# Patient Record
Sex: FEMALE | Race: WHITE | Employment: FULL TIME | URBAN - NONMETROPOLITAN AREA
[De-identification: names, ages, dates, MRNs, and addresses within clinical notes are randomized per-mention and may not be internally consistent; named-entity substitution may affect disease eponyms.]

---

## 2017-03-30 RX ORDER — CELECOXIB 200 MG/1
200 CAPSULE ORAL DAILY
COMMUNITY
End: 2018-10-29

## 2017-03-30 RX ORDER — HYDROXYZINE PAMOATE 25 MG/1
25 CAPSULE ORAL 3 TIMES DAILY PRN
COMMUNITY
End: 2018-10-29

## 2017-03-30 RX ORDER — SERTRALINE HYDROCHLORIDE 100 MG/1
100 TABLET, FILM COATED ORAL DAILY
COMMUNITY
End: 2018-10-29

## 2017-04-14 ENCOUNTER — NURSE ONLY (OUTPATIENT)
Dept: SURGERY | Age: 57
End: 2017-04-14

## 2017-04-14 VITALS
WEIGHT: 224 LBS | DIASTOLIC BLOOD PRESSURE: 72 MMHG | TEMPERATURE: 98.5 F | HEART RATE: 80 BPM | BODY MASS INDEX: 33.95 KG/M2 | HEIGHT: 68 IN | SYSTOLIC BLOOD PRESSURE: 119 MMHG

## 2017-04-14 DIAGNOSIS — Z12.11 ENCOUNTER FOR SCREENING COLONOSCOPY: Primary | ICD-10-CM

## 2017-04-14 RX ORDER — POLYETHYLENE GLYCOL 3350, SODIUM CHLORIDE, POTASSIUM CHLORIDE, SODIUM BICARBONATE, AND SODIUM SULFATE 240; 5.84; 2.98; 6.72; 22.72 G/4L; G/4L; G/4L; G/4L; G/4L
4000 POWDER, FOR SOLUTION ORAL ONCE
Qty: 1 BOTTLE | Refills: 0 | Status: SHIPPED | OUTPATIENT
Start: 2017-04-14 | End: 2017-04-14

## 2017-04-14 RX ORDER — PHENTERMINE HYDROCHLORIDE 37.5 MG/1
37.5 CAPSULE ORAL EVERY MORNING
COMMUNITY
End: 2017-07-03 | Stop reason: ALTCHOICE

## 2017-07-03 ENCOUNTER — OFFICE VISIT (OUTPATIENT)
Dept: OPHTHALMOLOGY | Age: 57
End: 2017-07-03
Payer: COMMERCIAL

## 2017-07-03 DIAGNOSIS — H40.003 GLAUCOMA SUSPECT, BILATERAL: Primary | ICD-10-CM

## 2017-07-03 PROCEDURE — 99214 OFFICE O/P EST MOD 30 MIN: CPT | Performed by: OPHTHALMOLOGY

## 2017-07-03 RX ORDER — HYDROCHLOROTHIAZIDE 12.5 MG/1
12.5 CAPSULE, GELATIN COATED ORAL DAILY
COMMUNITY
End: 2017-10-05 | Stop reason: ALTCHOICE

## 2017-07-03 RX ORDER — PHENYLEPHRINE HCL 2.5 %
1 DROPS OPHTHALMIC (EYE) ONCE
Status: COMPLETED | OUTPATIENT
Start: 2017-07-03 | End: 2017-07-03

## 2017-07-03 RX ORDER — TROPICAMIDE 10 MG/ML
1 SOLUTION/ DROPS OPHTHALMIC ONCE
Status: COMPLETED | OUTPATIENT
Start: 2017-07-03 | End: 2017-07-03

## 2017-07-03 RX ORDER — BENOXINATE HCL/FLUORESCEIN SOD 0.4%-0.25%
1 DROPS OPHTHALMIC (EYE) ONCE
Status: COMPLETED | OUTPATIENT
Start: 2017-07-03 | End: 2017-07-03

## 2017-07-03 RX ADMIN — Medication 1 DROP: at 13:18

## 2017-07-03 RX ADMIN — TROPICAMIDE 1 DROP: 10 SOLUTION/ DROPS OPHTHALMIC at 13:18

## 2017-07-03 ASSESSMENT — TONOMETRY
IOP_METHOD: APPLANATION W FLURESS DROP
OS_IOP_MMHG: 19
OD_IOP_MMHG: 19

## 2017-07-03 ASSESSMENT — SLIT LAMP EXAM - LIDS
COMMENTS: NORMAL
COMMENTS: NORMAL

## 2017-07-03 ASSESSMENT — VISUAL ACUITY
METHOD: SNELLEN - LINEAR
CORRECTION_TYPE: CONTACTS
OS_CC: 20/20

## 2017-08-16 ENCOUNTER — OFFICE VISIT (OUTPATIENT)
Dept: OPHTHALMOLOGY | Age: 57
End: 2017-08-16
Payer: COMMERCIAL

## 2017-08-16 DIAGNOSIS — H40.003 GLAUCOMA SUSPECT, BILATERAL: Primary | ICD-10-CM

## 2017-08-16 PROCEDURE — 92083 EXTENDED VISUAL FIELD XM: CPT | Performed by: OPHTHALMOLOGY

## 2017-10-05 ENCOUNTER — OFFICE VISIT (OUTPATIENT)
Dept: OPHTHALMOLOGY | Age: 57
End: 2017-10-05
Payer: COMMERCIAL

## 2017-10-05 DIAGNOSIS — H43.812 VITREOUS DEGENERATION, LEFT: ICD-10-CM

## 2017-10-05 DIAGNOSIS — H43.812 POSTERIOR VITREOUS DETACHMENT, LEFT: Primary | ICD-10-CM

## 2017-10-05 PROCEDURE — 99214 OFFICE O/P EST MOD 30 MIN: CPT | Performed by: OPHTHALMOLOGY

## 2017-10-05 RX ORDER — PHENYLEPHRINE HCL 2.5 %
1 DROPS OPHTHALMIC (EYE) ONCE
Status: COMPLETED | OUTPATIENT
Start: 2017-10-05 | End: 2017-10-05

## 2017-10-05 RX ORDER — TROPICAMIDE 10 MG/ML
1 SOLUTION/ DROPS OPHTHALMIC ONCE
Status: COMPLETED | OUTPATIENT
Start: 2017-10-05 | End: 2017-10-05

## 2017-10-05 RX ORDER — TOPIRAMATE 25 MG/1
25 TABLET ORAL 2 TIMES DAILY
COMMUNITY
End: 2018-10-29

## 2017-10-05 RX ADMIN — Medication 1 DROP: at 09:17

## 2017-10-05 RX ADMIN — TROPICAMIDE 1 DROP: 10 SOLUTION/ DROPS OPHTHALMIC at 09:18

## 2017-10-05 ASSESSMENT — VISUAL ACUITY
OS_CC: 20/20
OD_CC+: -1
CORRECTION_TYPE: GLASSES
METHOD: SNELLEN - LINEAR

## 2017-10-05 ASSESSMENT — SLIT LAMP EXAM - LIDS: COMMENTS: NORMAL

## 2017-10-05 NOTE — MR AVS SNAPSHOT
After Visit Summary             Adalgisa Vega   10/5/2017 9:45 AM   Office Visit    Description:  Female : 1960   Provider:  Álvaro Oakley MD   Department:  [facility]              Your Follow-Up and Future Appointments         Below is a list of your follow-up and future appointments. This may not be a complete list as you may have made appointments directly with providers that we are not aware of or your providers may have made some for you. Please call your providers to confirm appointments. It is important to keep your appointments. Please bring your current insurance card, photo ID, co-pay, and all medication bottles to your appointment. If self-pay, payment is expected at the time of service. Information from Your Visit        Department     Name Address Phone Fax    [facility] [address] [place] [phone]      Vital Signs     Smoking Status                   Former Smoker           Additional Information about your Body Mass Index (BMI)           Your BMI as listed above is considered obese (30 or more). BMI is an estimate of body fat, calculated from your height and weight. The higher your BMI, the greater your risk of heart disease, high blood pressure, type 2 diabetes, stroke, gallstones, arthritis, sleep apnea, and certain cancers. BMI is not perfect. It may overestimate body fat in athletes and people who are more muscular. Even a small weight loss (between 5 and 10 percent of your current weight) by decreasing your calorie intake and becoming more physically active will help lower your risk of developing or worsening diseases associated with obesity. Learn more at: [employer]             Today's Medication Changes          These changes are accurate as of: 10/5/17 10:02 AM.  If you have any questions, ask your nurse or doctor.                STOP taking these medications

## 2018-10-29 ENCOUNTER — INITIAL CONSULT (OUTPATIENT)
Dept: SURGERY | Age: 58
End: 2018-10-29
Payer: COMMERCIAL

## 2018-10-29 VITALS
HEART RATE: 64 BPM | WEIGHT: 238 LBS | TEMPERATURE: 97 F | HEIGHT: 68 IN | BODY MASS INDEX: 36.07 KG/M2 | DIASTOLIC BLOOD PRESSURE: 70 MMHG | SYSTOLIC BLOOD PRESSURE: 124 MMHG

## 2018-10-29 DIAGNOSIS — K82.8 BILIARY DYSKINESIA: ICD-10-CM

## 2018-10-29 DIAGNOSIS — R11.0 CHRONIC NAUSEA: Primary | ICD-10-CM

## 2018-10-29 PROCEDURE — 99204 OFFICE O/P NEW MOD 45 MIN: CPT | Performed by: SURGERY

## 2018-10-29 RX ORDER — TOPIRAMATE 50 MG/1
50 TABLET, FILM COATED ORAL
COMMUNITY
Start: 2018-05-08 | End: 2021-01-13

## 2018-10-29 RX ORDER — FLUTICASONE PROPIONATE 50 MCG
1-2 SPRAY, SUSPENSION (ML) NASAL
COMMUNITY
Start: 2018-05-08

## 2018-10-29 RX ORDER — HYDROXYZINE PAMOATE 25 MG/1
25 CAPSULE ORAL
COMMUNITY
Start: 2018-04-23

## 2018-10-29 RX ORDER — ASCORBIC ACID 500 MG
300 TABLET ORAL
COMMUNITY

## 2018-10-29 RX ORDER — LORATADINE 10 MG/1
10 TABLET ORAL
COMMUNITY
End: 2021-01-13

## 2018-10-29 RX ORDER — TOPIRAMATE 50 MG/1
TABLET, FILM COATED ORAL
COMMUNITY
Start: 2018-08-01 | End: 2021-01-13

## 2018-10-29 RX ORDER — CYCLOBENZAPRINE HCL 5 MG
5 TABLET ORAL
COMMUNITY
Start: 2018-08-07

## 2018-10-29 RX ORDER — ACETAMINOPHEN 500 MG
TABLET ORAL
COMMUNITY
Start: 2018-05-08

## 2018-10-29 RX ORDER — ATORVASTATIN CALCIUM 20 MG/1
20 TABLET, FILM COATED ORAL
COMMUNITY
Start: 2018-08-07 | End: 2020-03-11

## 2018-10-29 RX ORDER — SERTRALINE HYDROCHLORIDE 100 MG/1
100 TABLET, FILM COATED ORAL
COMMUNITY
Start: 2018-04-23

## 2018-10-29 RX ORDER — PANTOPRAZOLE SODIUM 40 MG/1
40 TABLET, DELAYED RELEASE ORAL
COMMUNITY
End: 2021-01-13

## 2018-10-29 RX ORDER — DICYCLOMINE HYDROCHLORIDE 10 MG/1
10 CAPSULE ORAL
COMMUNITY
End: 2021-01-13

## 2018-10-29 RX ORDER — LOSARTAN POTASSIUM AND HYDROCHLOROTHIAZIDE 12.5; 5 MG/1; MG/1
1 TABLET ORAL
COMMUNITY
Start: 2018-02-21

## 2018-10-29 ASSESSMENT — ENCOUNTER SYMPTOMS
SINUS PRESSURE: 1
SORE THROAT: 0
NAUSEA: 1
CHOKING: 0
SINUS PAIN: 0
CHEST TIGHTNESS: 0
WHEEZING: 0
DIARRHEA: 0
ABDOMINAL PAIN: 1
VOMITING: 0
EYES NEGATIVE: 1
SHORTNESS OF BREATH: 0
VOICE CHANGE: 0
CONSTIPATION: 0
ABDOMINAL DISTENTION: 1
BACK PAIN: 0
TROUBLE SWALLOWING: 0
COUGH: 0

## 2020-02-23 ENCOUNTER — HOSPITAL ENCOUNTER (OUTPATIENT)
Age: 60
Setting detail: OBSERVATION
Discharge: HOME | End: 2020-02-25
Attending: INTERNAL MEDICINE | Admitting: INTERNAL MEDICINE
Payer: COMMERCIAL

## 2020-02-23 PROCEDURE — 94760 N-INVAS EAR/PLS OXIMETRY 1: CPT

## 2020-02-23 PROCEDURE — G0378 HOSPITAL OBSERVATION PER HR: HCPCS

## 2020-02-23 PROCEDURE — G0379 DIRECT REFER HOSPITAL OBSERV: HCPCS

## 2020-02-23 PROCEDURE — 2580000003 HC RX 258: Performed by: SURGERY

## 2020-02-23 PROCEDURE — 6360000002 HC RX W HCPCS: Performed by: SURGERY

## 2020-02-23 PROCEDURE — 96361 HYDRATE IV INFUSION ADD-ON: CPT

## 2020-02-23 PROCEDURE — 96374 THER/PROPH/DIAG INJ IV PUSH: CPT

## 2020-02-23 RX ORDER — ONDANSETRON 2 MG/ML
4 INJECTION INTRAMUSCULAR; INTRAVENOUS EVERY 6 HOURS PRN
Status: DISCONTINUED | OUTPATIENT
Start: 2020-02-23 | End: 2020-02-24

## 2020-02-23 RX ORDER — SODIUM CHLORIDE, SODIUM LACTATE, POTASSIUM CHLORIDE, CALCIUM CHLORIDE 600; 310; 30; 20 MG/100ML; MG/100ML; MG/100ML; MG/100ML
INJECTION, SOLUTION INTRAVENOUS CONTINUOUS
Status: DISCONTINUED | OUTPATIENT
Start: 2020-02-23 | End: 2020-02-24

## 2020-02-23 RX ORDER — SUCRALFATE 1 G/1
1 TABLET ORAL DAILY
COMMUNITY
End: 2021-01-13

## 2020-02-23 RX ORDER — SODIUM CHLORIDE 0.9 % (FLUSH) 0.9 %
10 SYRINGE (ML) INJECTION EVERY 12 HOURS SCHEDULED
Status: DISCONTINUED | OUTPATIENT
Start: 2020-02-23 | End: 2020-02-25 | Stop reason: HOSPADM

## 2020-02-23 RX ORDER — SODIUM CHLORIDE 0.9 % (FLUSH) 0.9 %
10 SYRINGE (ML) INJECTION PRN
Status: DISCONTINUED | OUTPATIENT
Start: 2020-02-23 | End: 2020-02-24

## 2020-02-23 RX ADMIN — Medication 10 ML: at 20:35

## 2020-02-23 RX ADMIN — SODIUM CHLORIDE, POTASSIUM CHLORIDE, SODIUM LACTATE AND CALCIUM CHLORIDE: 600; 310; 30; 20 INJECTION, SOLUTION INTRAVENOUS at 20:34

## 2020-02-23 RX ADMIN — HYDROMORPHONE HYDROCHLORIDE 1 MG: 1 INJECTION, SOLUTION INTRAMUSCULAR; INTRAVENOUS; SUBCUTANEOUS at 20:33

## 2020-02-23 ASSESSMENT — PAIN SCALES - GENERAL: PAINLEVEL_OUTOF10: 8

## 2020-02-24 ENCOUNTER — ANESTHESIA (OUTPATIENT)
Dept: OPERATING ROOM | Age: 60
End: 2020-02-24
Payer: COMMERCIAL

## 2020-02-24 ENCOUNTER — ANESTHESIA EVENT (OUTPATIENT)
Dept: OPERATING ROOM | Age: 60
End: 2020-02-24
Payer: COMMERCIAL

## 2020-02-24 VITALS
DIASTOLIC BLOOD PRESSURE: 77 MMHG | SYSTOLIC BLOOD PRESSURE: 173 MMHG | OXYGEN SATURATION: 93 % | TEMPERATURE: 98.2 F | RESPIRATION RATE: 8 BRPM

## 2020-02-24 PROBLEM — R10.11 RIGHT UPPER QUADRANT ABDOMINAL PAIN: Status: ACTIVE | Noted: 2020-02-24

## 2020-02-24 PROBLEM — R10.9 ABDOMINAL PAIN: Status: ACTIVE | Noted: 2020-02-24

## 2020-02-24 LAB
ALBUMIN SERPL-MCNC: 3.6 G/DL (ref 3.5–5.2)
ALBUMIN/GLOBULIN RATIO: 1.4 (ref 1–2.5)
ALP BLD-CCNC: 73 U/L (ref 35–104)
ALT SERPL-CCNC: 17 U/L (ref 5–33)
ANION GAP SERPL CALCULATED.3IONS-SCNC: 14 MMOL/L (ref 9–17)
AST SERPL-CCNC: 14 U/L
BILIRUB SERPL-MCNC: 0.2 MG/DL (ref 0.3–1.2)
BILIRUBIN DIRECT: <0.08 MG/DL
BILIRUBIN, INDIRECT: ABNORMAL MG/DL (ref 0–1)
BUN BLDV-MCNC: 17 MG/DL (ref 6–20)
BUN/CREAT BLD: 38 (ref 9–20)
CALCIUM SERPL-MCNC: 8.6 MG/DL (ref 8.6–10.4)
CHLORIDE BLD-SCNC: 104 MMOL/L (ref 98–107)
CO2: 24 MMOL/L (ref 20–31)
CREAT SERPL-MCNC: 0.45 MG/DL (ref 0.5–0.9)
EKG ATRIAL RATE: 77 BPM
EKG P AXIS: 69 DEGREES
EKG P-R INTERVAL: 184 MS
EKG Q-T INTERVAL: 410 MS
EKG QRS DURATION: 98 MS
EKG QTC CALCULATION (BAZETT): 463 MS
EKG R AXIS: 68 DEGREES
EKG T AXIS: 53 DEGREES
EKG VENTRICULAR RATE: 77 BPM
GFR AFRICAN AMERICAN: >60 ML/MIN
GFR NON-AFRICAN AMERICAN: >60 ML/MIN
GFR SERPL CREATININE-BSD FRML MDRD: ABNORMAL ML/MIN/{1.73_M2}
GFR SERPL CREATININE-BSD FRML MDRD: ABNORMAL ML/MIN/{1.73_M2}
GLOBULIN: 2.5 G/DL (ref 1.5–3.8)
GLUCOSE BLD-MCNC: 109 MG/DL (ref 70–99)
HCT VFR BLD CALC: 38.8 % (ref 36.3–47.1)
HEMOGLOBIN: 12.5 G/DL (ref 11.9–15.1)
MAGNESIUM: 1.9 MG/DL (ref 1.6–2.6)
MCH RBC QN AUTO: 28.6 PG (ref 25.2–33.5)
MCHC RBC AUTO-ENTMCNC: 32.2 G/DL (ref 25.2–33.5)
MCV RBC AUTO: 88.8 FL (ref 82.6–102.9)
NRBC AUTOMATED: 0 PER 100 WBC
PDW BLD-RTO: 13.4 % (ref 11.8–14.4)
PLATELET # BLD: 239 K/UL (ref 138–453)
PMV BLD AUTO: 10.2 FL (ref 8.1–13.5)
POTASSIUM SERPL-SCNC: 3.2 MMOL/L (ref 3.7–5.3)
RBC # BLD: 4.37 M/UL (ref 3.95–5.11)
SODIUM BLD-SCNC: 142 MMOL/L (ref 135–144)
TOTAL PROTEIN: 6.1 G/DL (ref 6.4–8.3)
WBC # BLD: 6.3 K/UL (ref 3.5–11.3)

## 2020-02-24 PROCEDURE — 93005 ELECTROCARDIOGRAM TRACING: CPT | Performed by: SURGERY

## 2020-02-24 PROCEDURE — G0378 HOSPITAL OBSERVATION PER HR: HCPCS

## 2020-02-24 PROCEDURE — 2709999900 HC NON-CHARGEABLE SUPPLY: Performed by: SURGERY

## 2020-02-24 PROCEDURE — 2580000003 HC RX 258: Performed by: SURGERY

## 2020-02-24 PROCEDURE — 94761 N-INVAS EAR/PLS OXIMETRY MLT: CPT

## 2020-02-24 PROCEDURE — 6360000002 HC RX W HCPCS: Performed by: SURGERY

## 2020-02-24 PROCEDURE — 3600000009 HC SURGERY ROBOT BASE: Performed by: SURGERY

## 2020-02-24 PROCEDURE — 7100000000 HC PACU RECOVERY - FIRST 15 MIN: Performed by: SURGERY

## 2020-02-24 PROCEDURE — 7100000001 HC PACU RECOVERY - ADDTL 15 MIN: Performed by: SURGERY

## 2020-02-24 PROCEDURE — 3700000000 HC ANESTHESIA ATTENDED CARE: Performed by: SURGERY

## 2020-02-24 PROCEDURE — 99243 OFF/OP CNSLTJ NEW/EST LOW 30: CPT | Performed by: SURGERY

## 2020-02-24 PROCEDURE — S2900 ROBOTIC SURGICAL SYSTEM: HCPCS | Performed by: SURGERY

## 2020-02-24 PROCEDURE — 80076 HEPATIC FUNCTION PANEL: CPT

## 2020-02-24 PROCEDURE — 96376 TX/PRO/DX INJ SAME DRUG ADON: CPT

## 2020-02-24 PROCEDURE — 2500000003 HC RX 250 WO HCPCS: Performed by: SURGERY

## 2020-02-24 PROCEDURE — 3700000001 HC ADD 15 MINUTES (ANESTHESIA): Performed by: SURGERY

## 2020-02-24 PROCEDURE — 47562 LAPAROSCOPIC CHOLECYSTECTOMY: CPT | Performed by: SURGERY

## 2020-02-24 PROCEDURE — 6370000000 HC RX 637 (ALT 250 FOR IP): Performed by: INTERNAL MEDICINE

## 2020-02-24 PROCEDURE — 85027 COMPLETE CBC AUTOMATED: CPT

## 2020-02-24 PROCEDURE — 2500000003 HC RX 250 WO HCPCS: Performed by: NURSE ANESTHETIST, CERTIFIED REGISTERED

## 2020-02-24 PROCEDURE — 99232 SBSQ HOSP IP/OBS MODERATE 35: CPT | Performed by: INTERNAL MEDICINE

## 2020-02-24 PROCEDURE — 36415 COLL VENOUS BLD VENIPUNCTURE: CPT

## 2020-02-24 PROCEDURE — 88304 TISSUE EXAM BY PATHOLOGIST: CPT

## 2020-02-24 PROCEDURE — 80048 BASIC METABOLIC PNL TOTAL CA: CPT

## 2020-02-24 PROCEDURE — 3600000019 HC SURGERY ROBOT ADDTL 15MIN: Performed by: SURGERY

## 2020-02-24 PROCEDURE — 6360000002 HC RX W HCPCS: Performed by: NURSE ANESTHETIST, CERTIFIED REGISTERED

## 2020-02-24 PROCEDURE — APPSS45 APP SPLIT SHARED TIME 31-45 MINUTES: Performed by: NURSE PRACTITIONER

## 2020-02-24 PROCEDURE — 96361 HYDRATE IV INFUSION ADD-ON: CPT

## 2020-02-24 PROCEDURE — 2580000003 HC RX 258: Performed by: NURSE ANESTHETIST, CERTIFIED REGISTERED

## 2020-02-24 PROCEDURE — 6370000000 HC RX 637 (ALT 250 FOR IP): Performed by: NURSE PRACTITIONER

## 2020-02-24 PROCEDURE — 83735 ASSAY OF MAGNESIUM: CPT

## 2020-02-24 PROCEDURE — 96375 TX/PRO/DX INJ NEW DRUG ADDON: CPT

## 2020-02-24 RX ORDER — FENTANYL CITRATE 50 UG/ML
INJECTION, SOLUTION INTRAMUSCULAR; INTRAVENOUS PRN
Status: DISCONTINUED | OUTPATIENT
Start: 2020-02-24 | End: 2020-02-24 | Stop reason: SDUPTHER

## 2020-02-24 RX ORDER — SODIUM CHLORIDE 0.9 % (FLUSH) 0.9 %
10 SYRINGE (ML) INJECTION PRN
Status: DISCONTINUED | OUTPATIENT
Start: 2020-02-24 | End: 2020-02-24

## 2020-02-24 RX ORDER — ONDANSETRON 2 MG/ML
4 INJECTION INTRAMUSCULAR; INTRAVENOUS EVERY 6 HOURS PRN
Status: DISCONTINUED | OUTPATIENT
Start: 2020-02-24 | End: 2020-02-25 | Stop reason: HOSPADM

## 2020-02-24 RX ORDER — DOCUSATE SODIUM 100 MG/1
100 CAPSULE, LIQUID FILLED ORAL 2 TIMES DAILY PRN
Qty: 20 CAPSULE | Refills: 0 | Status: SHIPPED | OUTPATIENT
Start: 2020-02-24 | End: 2021-01-13

## 2020-02-24 RX ORDER — INDOCYANINE GREEN AND WATER 25 MG
2.5 KIT INJECTION
Status: COMPLETED | OUTPATIENT
Start: 2020-02-24 | End: 2020-02-24

## 2020-02-24 RX ORDER — POLYETHYLENE GLYCOL 3350 17 G/17G
17 POWDER, FOR SOLUTION ORAL DAILY PRN
Status: DISCONTINUED | OUTPATIENT
Start: 2020-02-24 | End: 2020-02-25 | Stop reason: HOSPADM

## 2020-02-24 RX ORDER — ACETAMINOPHEN 325 MG/1
650 TABLET ORAL EVERY 4 HOURS PRN
Status: DISCONTINUED | OUTPATIENT
Start: 2020-02-24 | End: 2020-02-25 | Stop reason: HOSPADM

## 2020-02-24 RX ORDER — ROCURONIUM BROMIDE 10 MG/ML
INJECTION, SOLUTION INTRAVENOUS PRN
Status: DISCONTINUED | OUTPATIENT
Start: 2020-02-24 | End: 2020-02-24 | Stop reason: SDUPTHER

## 2020-02-24 RX ORDER — OXYCODONE HYDROCHLORIDE AND ACETAMINOPHEN 5; 325 MG/1; MG/1
2 TABLET ORAL PRN
Status: DISCONTINUED | OUTPATIENT
Start: 2020-02-24 | End: 2020-02-24 | Stop reason: HOSPADM

## 2020-02-24 RX ORDER — POTASSIUM CHLORIDE 20MEQ/15ML
40 LIQUID (ML) ORAL ONCE
Status: COMPLETED | OUTPATIENT
Start: 2020-02-24 | End: 2020-02-24

## 2020-02-24 RX ORDER — OXYCODONE HYDROCHLORIDE AND ACETAMINOPHEN 5; 325 MG/1; MG/1
2 TABLET ORAL EVERY 4 HOURS PRN
Status: DISCONTINUED | OUTPATIENT
Start: 2020-02-24 | End: 2020-02-25 | Stop reason: HOSPADM

## 2020-02-24 RX ORDER — DEXAMETHASONE SODIUM PHOSPHATE 10 MG/ML
INJECTION INTRAMUSCULAR; INTRAVENOUS PRN
Status: DISCONTINUED | OUTPATIENT
Start: 2020-02-24 | End: 2020-02-24 | Stop reason: SDUPTHER

## 2020-02-24 RX ORDER — OXYCODONE HYDROCHLORIDE AND ACETAMINOPHEN 5; 325 MG/1; MG/1
1 TABLET ORAL PRN
Status: DISCONTINUED | OUTPATIENT
Start: 2020-02-24 | End: 2020-02-24 | Stop reason: HOSPADM

## 2020-02-24 RX ORDER — PHENYLEPHRINE HYDROCHLORIDE 10 MG/ML
INJECTION INTRAVENOUS PRN
Status: DISCONTINUED | OUTPATIENT
Start: 2020-02-24 | End: 2020-02-24 | Stop reason: SDUPTHER

## 2020-02-24 RX ORDER — ONDANSETRON 2 MG/ML
4 INJECTION INTRAMUSCULAR; INTRAVENOUS
Status: DISCONTINUED | OUTPATIENT
Start: 2020-02-24 | End: 2020-02-24 | Stop reason: HOSPADM

## 2020-02-24 RX ORDER — SODIUM CHLORIDE, SODIUM LACTATE, POTASSIUM CHLORIDE, CALCIUM CHLORIDE 600; 310; 30; 20 MG/100ML; MG/100ML; MG/100ML; MG/100ML
INJECTION, SOLUTION INTRAVENOUS CONTINUOUS PRN
Status: DISCONTINUED | OUTPATIENT
Start: 2020-02-24 | End: 2020-02-24 | Stop reason: SDUPTHER

## 2020-02-24 RX ORDER — OXYCODONE HYDROCHLORIDE 5 MG/1
5 CAPSULE ORAL EVERY 6 HOURS PRN
Qty: 28 CAPSULE | Refills: 0 | Status: SHIPPED | OUTPATIENT
Start: 2020-02-24 | End: 2020-03-02

## 2020-02-24 RX ORDER — PROPOFOL 10 MG/ML
INJECTION, EMULSION INTRAVENOUS PRN
Status: DISCONTINUED | OUTPATIENT
Start: 2020-02-24 | End: 2020-02-24 | Stop reason: SDUPTHER

## 2020-02-24 RX ORDER — MORPHINE SULFATE 2 MG/ML
1 INJECTION, SOLUTION INTRAMUSCULAR; INTRAVENOUS EVERY 5 MIN PRN
Status: DISCONTINUED | OUTPATIENT
Start: 2020-02-24 | End: 2020-02-24 | Stop reason: HOSPADM

## 2020-02-24 RX ORDER — LIDOCAINE HYDROCHLORIDE 20 MG/ML
INJECTION, SOLUTION EPIDURAL; INFILTRATION; INTRACAUDAL; PERINEURAL PRN
Status: DISCONTINUED | OUTPATIENT
Start: 2020-02-24 | End: 2020-02-24 | Stop reason: SDUPTHER

## 2020-02-24 RX ORDER — ONDANSETRON 4 MG/1
4 TABLET, FILM COATED ORAL 3 TIMES DAILY PRN
Qty: 15 TABLET | Refills: 0 | Status: SHIPPED | OUTPATIENT
Start: 2020-02-24 | End: 2021-01-13

## 2020-02-24 RX ORDER — OXYCODONE HYDROCHLORIDE AND ACETAMINOPHEN 5; 325 MG/1; MG/1
1 TABLET ORAL EVERY 4 HOURS PRN
Status: DISCONTINUED | OUTPATIENT
Start: 2020-02-24 | End: 2020-02-25 | Stop reason: HOSPADM

## 2020-02-24 RX ORDER — SODIUM CHLORIDE 0.9 % (FLUSH) 0.9 %
10 SYRINGE (ML) INJECTION EVERY 12 HOURS SCHEDULED
Status: DISCONTINUED | OUTPATIENT
Start: 2020-02-24 | End: 2020-02-24

## 2020-02-24 RX ORDER — MORPHINE SULFATE 2 MG/ML
2 INJECTION, SOLUTION INTRAMUSCULAR; INTRAVENOUS EVERY 5 MIN PRN
Status: DISCONTINUED | OUTPATIENT
Start: 2020-02-24 | End: 2020-02-24 | Stop reason: HOSPADM

## 2020-02-24 RX ADMIN — Medication 2 G: at 14:58

## 2020-02-24 RX ADMIN — SODIUM CHLORIDE, POTASSIUM CHLORIDE, SODIUM LACTATE AND CALCIUM CHLORIDE: 600; 310; 30; 20 INJECTION, SOLUTION INTRAVENOUS at 14:39

## 2020-02-24 RX ADMIN — POTASSIUM CHLORIDE 40 MEQ: 40 SOLUTION ORAL at 11:19

## 2020-02-24 RX ADMIN — PHENYLEPHRINE HYDROCHLORIDE 200 MCG: 10 INJECTION INTRAVENOUS at 15:02

## 2020-02-24 RX ADMIN — SUGAMMADEX 200 MG: 100 INJECTION, SOLUTION INTRAVENOUS at 15:49

## 2020-02-24 RX ADMIN — FENTANYL CITRATE 50 MCG: 50 INJECTION, SOLUTION INTRAMUSCULAR; INTRAVENOUS at 14:43

## 2020-02-24 RX ADMIN — SODIUM CHLORIDE, POTASSIUM CHLORIDE, SODIUM LACTATE AND CALCIUM CHLORIDE: 600; 310; 30; 20 INJECTION, SOLUTION INTRAVENOUS at 13:35

## 2020-02-24 RX ADMIN — INDOCYANINE GREEN AND WATER 2.5 MG: KIT at 14:10

## 2020-02-24 RX ADMIN — ACETAMINOPHEN 650 MG: 325 TABLET ORAL at 08:06

## 2020-02-24 RX ADMIN — ONDANSETRON 4 MG: 2 INJECTION INTRAMUSCULAR; INTRAVENOUS at 09:55

## 2020-02-24 RX ADMIN — MORPHINE SULFATE 2 MG: 2 INJECTION, SOLUTION INTRAMUSCULAR; INTRAVENOUS at 16:37

## 2020-02-24 RX ADMIN — ROCURONIUM BROMIDE 40 MG: 10 INJECTION, SOLUTION INTRAVENOUS at 14:49

## 2020-02-24 RX ADMIN — ROCURONIUM BROMIDE 10 MG: 10 INJECTION, SOLUTION INTRAVENOUS at 15:24

## 2020-02-24 RX ADMIN — OXYCODONE HYDROCHLORIDE AND ACETAMINOPHEN 1 TABLET: 5; 325 TABLET ORAL at 18:54

## 2020-02-24 RX ADMIN — SODIUM CHLORIDE, POTASSIUM CHLORIDE, SODIUM LACTATE AND CALCIUM CHLORIDE: 600; 310; 30; 20 INJECTION, SOLUTION INTRAVENOUS at 15:32

## 2020-02-24 RX ADMIN — HYDROMORPHONE HYDROCHLORIDE 1 MG: 1 INJECTION, SOLUTION INTRAMUSCULAR; INTRAVENOUS; SUBCUTANEOUS at 01:26

## 2020-02-24 RX ADMIN — SODIUM CHLORIDE, POTASSIUM CHLORIDE, SODIUM LACTATE AND CALCIUM CHLORIDE: 600; 310; 30; 20 INJECTION, SOLUTION INTRAVENOUS at 04:50

## 2020-02-24 RX ADMIN — PROPOFOL 200 MG: 10 INJECTION, EMULSION INTRAVENOUS at 14:49

## 2020-02-24 RX ADMIN — DEXAMETHASONE SODIUM PHOSPHATE 10 MG: 10 INJECTION INTRAMUSCULAR; INTRAVENOUS at 14:49

## 2020-02-24 RX ADMIN — HYDROMORPHONE HYDROCHLORIDE 1 MG: 1 INJECTION, SOLUTION INTRAMUSCULAR; INTRAVENOUS; SUBCUTANEOUS at 10:58

## 2020-02-24 RX ADMIN — LIDOCAINE HYDROCHLORIDE 80 MG: 20 INJECTION, SOLUTION EPIDURAL; INFILTRATION; INTRACAUDAL; PERINEURAL at 14:49

## 2020-02-24 RX ADMIN — FENTANYL CITRATE 50 MCG: 50 INJECTION, SOLUTION INTRAMUSCULAR; INTRAVENOUS at 14:49

## 2020-02-24 RX ADMIN — PHENYLEPHRINE HYDROCHLORIDE 200 MCG: 10 INJECTION INTRAVENOUS at 15:05

## 2020-02-24 ASSESSMENT — PAIN DESCRIPTION - DESCRIPTORS
DESCRIPTORS: ACHING
DESCRIPTORS: HEADACHE
DESCRIPTORS: JABBING;SHARP

## 2020-02-24 ASSESSMENT — PAIN DESCRIPTION - LOCATION
LOCATION: RIB CAGE
LOCATION: HEAD
LOCATION: ABDOMEN
LOCATION: RIB CAGE
LOCATION: ABDOMEN

## 2020-02-24 ASSESSMENT — PAIN SCALES - GENERAL
PAINLEVEL_OUTOF10: 9
PAINLEVEL_OUTOF10: 6
PAINLEVEL_OUTOF10: 3
PAINLEVEL_OUTOF10: 0
PAINLEVEL_OUTOF10: 0
PAINLEVEL_OUTOF10: 5
PAINLEVEL_OUTOF10: 6
PAINLEVEL_OUTOF10: 10
PAINLEVEL_OUTOF10: 7

## 2020-02-24 ASSESSMENT — PAIN DESCRIPTION - PAIN TYPE
TYPE: SURGICAL PAIN
TYPE: ACUTE PAIN

## 2020-02-24 ASSESSMENT — PAIN DESCRIPTION - ORIENTATION
ORIENTATION: RIGHT

## 2020-02-24 ASSESSMENT — PULMONARY FUNCTION TESTS
PIF_VALUE: 25
PIF_VALUE: 29
PIF_VALUE: 24
PIF_VALUE: 2
PIF_VALUE: 23
PIF_VALUE: 29
PIF_VALUE: 25
PIF_VALUE: 27
PIF_VALUE: 2
PIF_VALUE: 24
PIF_VALUE: 24
PIF_VALUE: 20
PIF_VALUE: 19
PIF_VALUE: 25
PIF_VALUE: 25
PIF_VALUE: 26
PIF_VALUE: 27
PIF_VALUE: 3
PIF_VALUE: 25
PIF_VALUE: 25
PIF_VALUE: 29
PIF_VALUE: 27
PIF_VALUE: 26
PIF_VALUE: 25
PIF_VALUE: 27
PIF_VALUE: 25
PIF_VALUE: 26
PIF_VALUE: 24
PIF_VALUE: 23
PIF_VALUE: 27
PIF_VALUE: 21
PIF_VALUE: 25
PIF_VALUE: 26
PIF_VALUE: 2
PIF_VALUE: 19
PIF_VALUE: 27
PIF_VALUE: 25
PIF_VALUE: 26
PIF_VALUE: 24
PIF_VALUE: 24
PIF_VALUE: 25
PIF_VALUE: 26
PIF_VALUE: 23
PIF_VALUE: 26
PIF_VALUE: 31
PIF_VALUE: 21
PIF_VALUE: 24
PIF_VALUE: 15
PIF_VALUE: 24
PIF_VALUE: 25
PIF_VALUE: 1
PIF_VALUE: 26
PIF_VALUE: 2
PIF_VALUE: 3
PIF_VALUE: 27
PIF_VALUE: 27
PIF_VALUE: 25
PIF_VALUE: 29
PIF_VALUE: 23
PIF_VALUE: 26
PIF_VALUE: 31
PIF_VALUE: 25
PIF_VALUE: 24
PIF_VALUE: 23
PIF_VALUE: 25
PIF_VALUE: 19
PIF_VALUE: 3

## 2020-02-24 ASSESSMENT — PAIN DESCRIPTION - FREQUENCY
FREQUENCY: INTERMITTENT
FREQUENCY: CONTINUOUS
FREQUENCY: INTERMITTENT
FREQUENCY: CONTINUOUS
FREQUENCY: CONTINUOUS
FREQUENCY: INTERMITTENT
FREQUENCY: INTERMITTENT

## 2020-02-24 ASSESSMENT — PAIN DESCRIPTION - PROGRESSION
CLINICAL_PROGRESSION: NOT CHANGED
CLINICAL_PROGRESSION: NOT CHANGED

## 2020-02-24 ASSESSMENT — PAIN DESCRIPTION - ONSET
ONSET: ON-GOING
ONSET: ON-GOING

## 2020-02-24 NOTE — ANESTHESIA PRE PROCEDURE
COLONOSCOPY  2017    FAMILY HX COLON CANCER-[facility]    FOOT SURGERY Bilateral     Bunion, hammer toe    KNEE SURGERY Left     SKIN CANCER EXCISION Left     Left side face-melonoma       Social History:    Social History     Tobacco Use    Smoking status: Former Smoker     Packs/day: 1.00     Years: 34.00     Pack years: 34.00     Last attempt to quit: 2012     Years since quittin.8    Smokeless tobacco: Never Used    Tobacco comment: yanivan 2020   Substance Use Topics    Alcohol use: Yes     Comment: occasional                                Counseling given: Not Answered  Comment: darius 2020      Vital Signs (Current):   Vitals:    20 0350 20 0710 20 0943 20 1248   BP: 128/60 127/64  (!) 132/91   Pulse: 84 78  73   Resp:    18   Temp: 36.9 °C (98.4 °F) 36.8 °C (98.3 °F)  36.7 °C (98 °F)   TempSrc: Oral Oral  Tympanic   SpO2: 92% 93% 94% 93%   Weight:       Height:                                                  BP Readings from Last 3 Encounters:   20 (!) 132/91   10/29/18 124/70   17 119/72       NPO Status: Time of last liquid consumption: 806                        Time of last solid consumption: 530                        Date of last liquid consumption: 20                        Date of last solid food consumption: 20    BMI:   Wt Readings from Last 3 Encounters:   20 254 lb 3.2 oz (115.3 kg)   10/29/18 238 lb (108 kg)   17 224 lb (101.6 kg)     Body mass index is 38.09 kg/m².     CBC:   Lab Results   Component Value Date    WBC 6.3 2020    RBC 4.37 2020    HGB 12.5 2020    HCT 38.8 2020    MCV 88.8 2020    RDW 13.4 2020     2020       CMP:   Lab Results   Component Value Date     2020    K 3.2 2020     2020    CO2 24 2020    BUN 17 2020    CREATININE 0.45 2020    GFRAA >60 2020    LABGLOM >60 2020    GLUCOSE 109

## 2020-02-24 NOTE — H&P
HOSPITALIST ADMISSION H&P      REASON FOR ADMISSION: RUQ abdominal pain -- possible cholecystitis    ESTIMATED LENGTH OF STAY: <2 midnights, 1-2 days    ATTENDING/ADMITTING PHYSICIAN: Ewelina Dixon MD  PCP: Dennis Jimenez    HISTORY OF PRESENT ILLNESS:      The patient is a 63 y.o. female patient of Dennis Jimenez who presents from [facility] ER with c/o right upper abdominal pain, diarrhea, nausea, and vomiting for the past 10 days. She states she's had similar pain before and was told it was her gallbladder. She denies any fevers. She describes the pain as constant, radiating to her right shoulder at times, worse with eating and palpation, and improved with IV pain medications in   ER. She has had a previous appendectomy. Previous HIDA scan showed dyskinesia in 2018. At [facility] ER, she was afebrile, WBC 13.5. She states she had an ultrasound of her gallbladder, however results are unavailable in EMR at this time. She was started on IV Rocephin and transferred here lastnight. This morning, WBC 6.3, she remains afebrile, and her pain is more tolerable, rated a 4 on a pain scale of 0-10. Hypokalemia 3.2     See below for additional PMH. Patient imod-dndpczexhy-uhndslgf-available records reviewed, including, but not limited to ER records, imaging results, lab results, office records, personal records, and OARRS -- no signs of abuse or diversion.      Past Medical History:   Diagnosis Date    Allergic rhinitis     Anxiety     Chronic back pain     Chronic pain of right knee     Family history of colon cancer     History of tobacco abuse     Hyperlipidemia     Hypertension     Melanoma ([facility] [address]     Sinusitis            Past Surgical History:   Procedure Laterality Date    APPENDECTOMY      COLONOSCOPY      COLONOSCOPY  04/28/2017    F[facility]    FOOT SURGERY Bilateral     Bunion, hammer toe    KNEE SURGERY Left     SKIN CANCER EXCISION Left Left side face-melonoma       Medications Prior to Admission:    Medications Prior to Admission: sucralfate (CARAFATE) 1 GM tablet, Take 1 g by mouth daily TOOK THIS AM AT 0500  Probiotic Product (PRO-BIOTIC BLEND) CAPS, Take 1 capsule by mouth daily  atorvastatin (LIPITOR) 20 MG tablet, Take 20 mg by mouth  cyclobenzaprine (FLEXERIL) 5 MG tablet, Take 5 mg by mouth  Melatonin 300 MCG TABS, Take 300 mcg by mouth  pantoprazole (PROTONIX) 40 MG tablet, Take 40 mg by mouth  acetaminophen (TYLENOL) 500 MG tablet, 1-2 twice a day as needed  fluticasone (FLONASE) 50 MCG/ACT nasal spray, 1-2 sprays by Nasal route  hydrOXYzine (VISTARIL) 25 MG capsule, Take 25 mg by mouth  loratadine (CLARITIN) 10 MG tablet, Take 10 mg by mouth  losartan-hydrochlorothiazide (HYZAAR) 50-12.5 MG per tablet, Take 1 tablet by mouth  sertraline (ZOLOFT) 100 MG tablet, Take 100 mg by mouth  topiramate (TOPAMAX) 50 MG tablet,   dicyclomine (BENTYL) 10 MG capsule, Take 10 mg by mouth  topiramate (TOPAMAX) 50 MG tablet, Take 50 mg by mouth  cetirizine (ZYRTEC) 10 MG tablet, Take 10 mg by mouth daily  Multiple Vitamins-Minerals (CENTRAVITES PO), Take by mouth    Allergies:    Celecoxib; Darvon [propoxyphene]; and Onexton [clindamycin phos-benzoyl perox]    Social History:    reports that she quit smoking about 7 years ago. She has a 34.00 pack-year smoking history. She has never used smokeless tobacco. She reports current alcohol use. Family History:   family history includes Breast Cancer in her maternal grandmother; Breast Cancer (age of onset: 52) in her sister; COPD in her mother; Cancer (age of onset: 52) in her sister; Cancer (age of onset: 64) in her brother; Colon Polyps in her mother; Emphysema in her mother; Glaucoma in her father; Parkinsonism in her father.     REVIEW OF SYSTEMS:  See HPI and problem list; otherwise no other new complaints with respect to eyes, ENT, neck, pulmonary, coronary, chest, GI, , endocrine, musculoskeletal, immune system/connective tissue disease, hematologic, neurologic, psychiatric, skin, lymphatics, or malignancies. Code status discussed with patient/family--wishes for Full Code at this time. PHYSICAL EXAM:  Vitals:  /64   Pulse 78   Temp 98.3 °F (36.8 °C) (Oral)   Resp 20   Ht 5' 8.5\" (1.74 m)   Wt 254 lb 3.2 oz (115.3 kg)   SpO2 94%   BMI 38.09 kg/m²     General: awake, alert and cooperative  HEENT: Mucosa Pink, Moist, External nose normal, Normocephalic and Atraumatic  Neck: Supple, No Masses, Tenderness, Nodularity and No Lymphadenopathy  Chest/Lungs: Clear to Auscultation without Rales, Rhonchi, or Wheezes and Respirations even and unlabored  Cardiac: Regular Rate and Rhythm without Rubs, Clicks, Gallops, or Murmurs and Pedal Pulses Palpable Bilaterally  GI/Abdomen:  Bowel Sounds Present and Soft, no guarding, no rebound tenderness, c/o pain with palpation RUQ  : Not examined  Extremities/Musculoskeletal: All four extremities without edema and All four extremities with 5/5 strength  Skin: No Cyanosis, No rash and Skin warm and dry  Neuro: Alert and Oriented, to Person, to Time, to Place, to Situation and No Localizing Signs/Symptoms    LABS:    CBC with Differential:    Lab Results   Component Value Date    WBC 6.3 02/24/2020    RBC 4.37 02/24/2020    HGB 12.5 02/24/2020    HCT 38.8 02/24/2020     02/24/2020    MCV 88.8 02/24/2020    MCH 28.6 02/24/2020    MCHC 32.2 02/24/2020    RDW 13.4 02/24/2020     CMP:    Lab Results   Component Value Date     02/24/2020    K 3.2 02/24/2020     02/24/2020    CO2 24 02/24/2020    BUN 17 02/24/2020    CREATININE 0.45 02/24/2020    GFRAA >60 02/24/2020    LABGLOM >60 02/24/2020    GLUCOSE 109 02/24/2020    PROT 6.1 02/24/2020    LABALBU 3.6 02/24/2020    CALCIUM 8.6 02/24/2020    BILITOT 0.20 02/24/2020    ALKPHOS 73 02/24/2020    AST 14 02/24/2020    ALT 17 02/24/2020       ASSESSMENT:      Patient Active Problem List   Diagnosis   

## 2020-02-24 NOTE — PLAN OF CARE
Problem: SAFETY  Goal: Free from accidental physical injury  Outcome: Ongoing     Problem: DAILY CARE  Goal: Daily care needs are met  Outcome: Ongoing     Problem: PAIN  Goal: Patient's pain/discomfort is manageable  Outcome: Ongoing     Problem: KNOWLEDGE DEFICIT  Goal: Patient/S.O. demonstrates understanding of disease process, treatment plan, medications, and discharge instructions.   Outcome: Ongoing     Problem: DISCHARGE BARRIERS  Goal: Patient's continuum of care needs are met  Outcome: Ongoing

## 2020-02-24 NOTE — CONSULTS
General Surgery   Consultation        PATIENT NAME: Rojas Gray   YOB: 1960    ADMISSION DATE: 2/23/2020  6:37 PM     Admitting Provider:  Leslye Wren Physician: Stann Eisenmenger DATE: 2/24/2020    Consult Regarding:  RUQ pain, hx of biliary dyskinesia    HISTORY OF PRESENT ILLNESS:  The patient is a 63 y.o. female  who presents with approx 10 day hx of RUQ pain. Pt states when this first started it was mild pain in the RUQ radiating into back. She thought this may be muscle strain. Then yesterday she had acute worsening of the pain with associated nausea and emesis after meal.  Pt does have hx of biliary dyskinesia on prior work ups but never proceeded with surgery. She was evaluated at [facility] and was noted to have mild leukocytosis. RUQ shows no stones but due to her hx and presenting symptoms it was thought this is likely due to her known gallbladder pathology. She was transferred to [facility] for surgical consult. Denies hematemesis. Has been having loose stools since onset of the RUQ pain. Nausea and bilious emesis yesterday when pain worsened.     Past Medical History:        Diagnosis Date    Allergic rhinitis     Anxiety     Chronic back pain     Chronic pain of right knee     Family history of colon cancer     History of tobacco abuse     Hyperlipidemia     Hypertension     Melanoma ([address]     Sinusitis        Past Surgical History:        Procedure Laterality Date    APPENDECTOMY      COLONOSCOPY      COLONOSCOPY  04/28/2017    [facility]    FOOT SURGERY Bilateral     Bunion, hammer toe    KNEE SURGERY Left     SKIN CANCER EXCISION Left     Left side face-melonoma       Medications Prior to Admission:   Medications Prior to Admission: sucralfate (CARAFATE) 1 GM tablet, Take 1 g by mouth daily TOOK THIS AM AT 0500  Probiotic Product (PRO-BIOTIC BLEND) CAPS, Take 1 capsule by mouth daily  atorvastatin (LIPITOR) 20 MG tablet, Take 20 mg by  Social connections:     Talks on phone: Not on file     Gets together: Not on file     Attends Buddhist service: Not on file     Active member of club or organization: Not on file     Attends meetings of clubs or organizations: Not on file     Relationship status: Not on file    Intimate partner violence:     Fear of current or ex partner: Not on file     Emotionally abused: Not on file     Physically abused: Not on file     Forced sexual activity: Not on file   Other Topics Concern    Not on file   Social History Narrative    Not on file       Family History:       Problem Relation Age of Onset    COPD Mother     Colon Polyps Mother     Emphysema Mother     Breast Cancer Maternal Grandmother     Glaucoma Father     Parkinsonism Father     Cancer Sister 52        Colon cancer    Cancer Brother 64        CLL    Breast Cancer Sister 52       REVIEW OF SYSTEMS:    CONSTITUTIONAL:  No recent weight gain/loss. Energy level normal for pt. HEENT:  negative  CARDIOVASCULAR:  No chest pain  GASTROINTESTINAL:  Per HPI  GENITOURINARY:  No dysuria  HEMATOLOGIC/LYMPHATIC:  No easy bruising. No history of cancer  ENDOCRINE: negative  Review of systems negative unless above.     PHYSICAL EXAM:    VITALS:  /64   Pulse 78   Temp 98.3 °F (36.8 °C) (Oral)   Resp 20   Ht 5' 8.5\" (1.74 m)   Wt 254 lb 3.2 oz (115.3 kg)   SpO2 94%   BMI 38.09 kg/m²   INTAKE/OUTPUT:     Intake/Output Summary (Last 24 hours) at 2/24/2020 1030  Last data filed at 2/24/2020 0433  Gross per 24 hour   Intake 984 ml   Output --   Net 984 ml       CONSTITUTIONAL:  awake, alert, not distressed  ENT:  normocephalic/atraumatic, without obvious abnormality  NECK:  supple, symmetrical, trachea midline   LUNGS:  clear to auscultation  CARDIOVASCULAR:  regular rate and rhythm   ABDOMEN: soft, non distended, tender to palpation in RUQ with negative haddad's sign, bowel sounds present  MUSCULOSKELETAL:  negative, there is not obvious somatic dysfunction  NEUROLOGIC:  Mental Status Exam:  Level of Alertness:   alert  Orientation:   oriented to person, place, and time    CBC:   Lab Results   Component Value Date    WBC 6.3 02/24/2020    RBC 4.37 02/24/2020    HGB 12.5 02/24/2020    HCT 38.8 02/24/2020    MCV 88.8 02/24/2020    MCH 28.6 02/24/2020    MCHC 32.2 02/24/2020    RDW 13.4 02/24/2020     02/24/2020    MPV 10.2 02/24/2020     CMP:    Lab Results   Component Value Date     02/24/2020    K 3.2 02/24/2020     02/24/2020    CO2 24 02/24/2020    BUN 17 02/24/2020    CREATININE 0.45 02/24/2020    GFRAA >60 02/24/2020    LABGLOM >60 02/24/2020    GLUCOSE 109 02/24/2020    PROT 6.1 02/24/2020    LABALBU 3.6 02/24/2020    CALCIUM 8.6 02/24/2020    BILITOT 0.20 02/24/2020    ALKPHOS 73 02/24/2020    AST 14 02/24/2020    ALT 17 02/24/2020     BMP:    Lab Results   Component Value Date     02/24/2020    K 3.2 02/24/2020     02/24/2020    CO2 24 02/24/2020    BUN 17 02/24/2020    LABALBU 3.6 02/24/2020    CREATININE 0.45 02/24/2020    CALCIUM 8.6 02/24/2020    GFRAA >60 02/24/2020    LABGLOM >60 02/24/2020    GLUCOSE 109 02/24/2020     Hepatic Function Panel:    Lab Results   Component Value Date    ALKPHOS 73 02/24/2020    ALT 17 02/24/2020    AST 14 02/24/2020    PROT 6.1 02/24/2020    BILITOT 0.20 02/24/2020    BILIDIR <0.08 02/24/2020    IBILI CANNOT BE CALCULATED 02/24/2020    LABALBU 3.6 02/24/2020       Pertinent Radiology:       ASSESSMENT   Active Problems:    * No active hospital problems. *  Resolved Problems:    * No resolved hospital problems. *    62 yo female presenting with RUQ pain radiating into back with associated N/V. Hx of biliary dyskinesia on prior work up. Prior HIDA showed EF of 13%    PLAN    1. I do think the current symptoms are likely related to her known gallbladder dysfunction. Pt does wish to proceed with surgery.   I have discussed risks of robotic assisted lap edy including bleeding, infection, scarring, pain, damage to surrounding structures including possible bile duct injury, conversion to open, need for further surgery and anesthesia risks. 2. Keep NPO  3. Antibiotics on call to OR. 4. ICG on call to OR  5.  Likely discharge after surgery        Electronically signed by Pushpa Gustafson DO  on 2/24/2020 at 10:30 AM

## 2020-02-25 VITALS
RESPIRATION RATE: 20 BRPM | BODY MASS INDEX: 36.54 KG/M2 | WEIGHT: 246.7 LBS | HEIGHT: 69 IN | DIASTOLIC BLOOD PRESSURE: 65 MMHG | SYSTOLIC BLOOD PRESSURE: 135 MMHG | TEMPERATURE: 97.4 F | OXYGEN SATURATION: 96 % | HEART RATE: 73 BPM

## 2020-02-25 LAB
ABSOLUTE EOS #: <0.03 K/UL (ref 0–0.44)
ABSOLUTE IMMATURE GRANULOCYTE: <0.03 K/UL (ref 0–0.3)
ABSOLUTE LYMPH #: 1.21 K/UL (ref 1.1–3.7)
ABSOLUTE MONO #: 0.39 K/UL (ref 0.1–1.2)
ANION GAP SERPL CALCULATED.3IONS-SCNC: 14 MMOL/L (ref 9–17)
BASOPHILS # BLD: 0 % (ref 0–2)
BASOPHILS ABSOLUTE: <0.03 K/UL (ref 0–0.2)
BUN BLDV-MCNC: 9 MG/DL (ref 6–20)
BUN/CREAT BLD: 16 (ref 9–20)
CALCIUM SERPL-MCNC: 9.4 MG/DL (ref 8.6–10.4)
CHLORIDE BLD-SCNC: 104 MMOL/L (ref 98–107)
CO2: 24 MMOL/L (ref 20–31)
CREAT SERPL-MCNC: 0.57 MG/DL (ref 0.5–0.9)
DIFFERENTIAL TYPE: ABNORMAL
EOSINOPHILS RELATIVE PERCENT: 0 % (ref 1–4)
GFR AFRICAN AMERICAN: >60 ML/MIN
GFR NON-AFRICAN AMERICAN: >60 ML/MIN
GFR SERPL CREATININE-BSD FRML MDRD: ABNORMAL ML/MIN/{1.73_M2}
GFR SERPL CREATININE-BSD FRML MDRD: ABNORMAL ML/MIN/{1.73_M2}
GLUCOSE BLD-MCNC: 128 MG/DL (ref 70–99)
HCT VFR BLD CALC: 40.2 % (ref 36.3–47.1)
HEMOGLOBIN: 13.1 G/DL (ref 11.9–15.1)
IMMATURE GRANULOCYTES: 0 %
LYMPHOCYTES # BLD: 16 % (ref 24–43)
MCH RBC QN AUTO: 28.6 PG (ref 25.2–33.5)
MCHC RBC AUTO-ENTMCNC: 32.6 G/DL (ref 25.2–33.5)
MCV RBC AUTO: 87.8 FL (ref 82.6–102.9)
MONOCYTES # BLD: 5 % (ref 3–12)
NRBC AUTOMATED: 0 PER 100 WBC
PDW BLD-RTO: 13 % (ref 11.8–14.4)
PLATELET # BLD: 256 K/UL (ref 138–453)
PLATELET ESTIMATE: ABNORMAL
PMV BLD AUTO: 10.1 FL (ref 8.1–13.5)
POTASSIUM SERPL-SCNC: 4 MMOL/L (ref 3.7–5.3)
RBC # BLD: 4.58 M/UL (ref 3.95–5.11)
RBC # BLD: ABNORMAL 10*6/UL
SEG NEUTROPHILS: 79 % (ref 36–65)
SEGMENTED NEUTROPHILS ABSOLUTE COUNT: 5.99 K/UL (ref 1.5–8.1)
SODIUM BLD-SCNC: 142 MMOL/L (ref 135–144)
WBC # BLD: 7.6 K/UL (ref 3.5–11.3)
WBC # BLD: ABNORMAL 10*3/UL

## 2020-02-25 PROCEDURE — 6370000000 HC RX 637 (ALT 250 FOR IP): Performed by: INTERNAL MEDICINE

## 2020-02-25 PROCEDURE — 99238 HOSP IP/OBS DSCHRG MGMT 30/<: CPT | Performed by: INTERNAL MEDICINE

## 2020-02-25 PROCEDURE — 94760 N-INVAS EAR/PLS OXIMETRY 1: CPT

## 2020-02-25 PROCEDURE — 80048 BASIC METABOLIC PNL TOTAL CA: CPT

## 2020-02-25 PROCEDURE — 85025 COMPLETE CBC W/AUTO DIFF WBC: CPT

## 2020-02-25 PROCEDURE — G0378 HOSPITAL OBSERVATION PER HR: HCPCS

## 2020-02-25 PROCEDURE — 36415 COLL VENOUS BLD VENIPUNCTURE: CPT

## 2020-02-25 RX ORDER — HYDROCHLOROTHIAZIDE 12.5 MG/1
12.5 TABLET ORAL DAILY
Status: DISCONTINUED | OUTPATIENT
Start: 2020-02-25 | End: 2020-02-25 | Stop reason: HOSPADM

## 2020-02-25 RX ORDER — ATORVASTATIN CALCIUM 10 MG/1
20 TABLET, FILM COATED ORAL DAILY
Status: DISCONTINUED | OUTPATIENT
Start: 2020-02-25 | End: 2020-02-25 | Stop reason: HOSPADM

## 2020-02-25 RX ORDER — LOSARTAN POTASSIUM AND HYDROCHLOROTHIAZIDE 12.5; 5 MG/1; MG/1
1 TABLET ORAL DAILY
Status: DISCONTINUED | OUTPATIENT
Start: 2020-02-25 | End: 2020-02-25 | Stop reason: CLARIF

## 2020-02-25 RX ORDER — LOSARTAN POTASSIUM 50 MG/1
50 TABLET ORAL DAILY
Status: DISCONTINUED | OUTPATIENT
Start: 2020-02-25 | End: 2020-02-25 | Stop reason: HOSPADM

## 2020-02-25 RX ORDER — SUCRALFATE 1 G/1
1 TABLET ORAL DAILY
Status: DISCONTINUED | OUTPATIENT
Start: 2020-02-25 | End: 2020-02-25 | Stop reason: HOSPADM

## 2020-02-25 RX ADMIN — OXYCODONE HYDROCHLORIDE AND ACETAMINOPHEN 2 TABLET: 5; 325 TABLET ORAL at 10:07

## 2020-02-25 RX ADMIN — OXYCODONE HYDROCHLORIDE AND ACETAMINOPHEN 1 TABLET: 5; 325 TABLET ORAL at 05:53

## 2020-02-25 ASSESSMENT — PAIN DESCRIPTION - ORIENTATION
ORIENTATION: RIGHT;UPPER
ORIENTATION: RIGHT;UPPER

## 2020-02-25 ASSESSMENT — PAIN DESCRIPTION - LOCATION
LOCATION: ABDOMEN
LOCATION: ABDOMEN

## 2020-02-25 ASSESSMENT — PAIN DESCRIPTION - DESCRIPTORS
DESCRIPTORS: TENDER
DESCRIPTORS: DISCOMFORT

## 2020-02-25 ASSESSMENT — PAIN SCALES - GENERAL
PAINLEVEL_OUTOF10: 7
PAINLEVEL_OUTOF10: 5
PAINLEVEL_OUTOF10: 3

## 2020-02-25 ASSESSMENT — PAIN DESCRIPTION - PAIN TYPE
TYPE: SURGICAL PAIN
TYPE: SURGICAL PAIN

## 2020-02-25 NOTE — OP NOTE
represented a  gallbladder pathology and we discussed proceeding with surgery. The  patient was agreeable and prior to the time of procedure, risks,  benefits, and alternatives were explained to the patient and consent was  obtained. DESCRIPTION OF PROCEDURE:  The patient was brought to the operative  suite, placed on the operative table in supine position. Arms were  tucked. Monitoring devices and SCD cuffs were placed. General  endotracheal anesthesia was induced. After induction of anesthesia,  time-out was performed and correct patient and procedure were verified. The patient received preoperative antibiotics prior to incision time in  accordance with SCIP protocol. The patient's abdomen was prepped and  draped in sterile fashion. The skin below the costal margin on the left  side at approximately the midclavicular line was anesthetized with a  local anesthetic of 1% lidocaine with epinephrine and 0.25% Marcaine  plain in a 1:1 ratio. Through the anesthetized region of skin, a 1.5 cm  incision was made. Veress needle was inserted through this incision and  advanced into the abdominal cavity. Aspiration of the Veress needle did  not reveal any aspiration of blood or enteric fluid, and a saline drop  test performed showed free flow of saline. The Veress needle was  connected to insufflation tubing and the patient's abdomen was  insufflated to 15 mmHg. The Veress needle was then removed and an 8 mm  robotic trocar with the laparoscope in place was advanced through this  same incision into the abdominal cavity. Care was taken to avoid damage  to underlying tissue. Once the trocar was in place, inspection revealed  no damage to tissues during entrance of the abdominal cavity. Cursory  inspection of the abdomen revealed that there were some adhesions of  omentum to the gallbladder with no other acute findings.   Using the  laparoscope for visualization, three additional 8 mm trocars were was placed into this and cinched down. Final  inspection of the liver bed again revealed good hemostasis, and there  was no evidence of other pathology. The robotic system was then  undocked, and the right working port was removed to allow removal of  EndoCatch bag. This was done under visualization with the laparoscope. Next, the 2 left-sided working ports were removed from the abdominal  cavity and finally, the laparoscope was removed and the umbilical port  was uncapped to allow evacuation of CO2 from the abdomen. This port was  then also removed. All skin incisions were then anesthetized with  additional local mixture of 1% lidocaine with epinephrine and 0.25%  Marcaine plain in a 1:1 ratio. Skin incisions were then closed with 4-0  Monocryl in a subcuticular fashion. The abdomen was cleansed and dried,  and Dermabond skin glue was placed across all incisions. At the  conclusion of the procedure, all sponge, instrument, and needle counts  were correct. The patient was awakened from anesthesia and taken to the  PACU in stable condition.         Kyleigh Ferreira    D: 02/24/2020 20:02:28       T: 02/24/2020 20:06:52     [email or web address]  Job#: [id number]     Doc#: [id number]    CC:  Niurka Hansen

## 2020-02-25 NOTE — PLAN OF CARE
Problem: SAFETY  Goal: Free from accidental physical injury  Outcome: Ongoing     Problem: DAILY CARE  Goal: Daily care needs are met  Outcome: Ongoing     Problem: PAIN  Goal: Patient's pain/discomfort is manageable  Outcome: Ongoing     Problem: KNOWLEDGE DEFICIT  Goal: Patient/S.O. demonstrates understanding of disease process, treatment plan, medications, and discharge instructions. Outcome: Ongoing     Problem: DISCHARGE BARRIERS  Goal: Patient's continuum of care needs are met  Outcome: Ongoing     Problem: Pain:  Description  Pain management should include both nonpharmacologic and pharmacologic interventions.   Goal: Pain level will decrease  Description  Pain level will decrease  Outcome: Ongoing  Goal: Control of acute pain  Description  Control of acute pain  Outcome: Ongoing  Goal: Control of chronic pain  Description  Control of chronic pain  Outcome: Ongoing

## 2020-02-26 NOTE — DISCHARGE SUMMARY
of discharge, white cell count 7.6,  hemoglobin 13.1, hematocrit 40.2, platelets 996,018. Sodium 142,  potassium 4.0, chloride 104, CO2 of 24, BUN 9, creatinine 0.57, glucose  128, calcium 9.4, GFR greater than 60. DISCHARGE INSTRUCTIONS:  FOLLOWUP:  Discharged to home on 02/25/2020. DIET:  Cardiac. ACTIVITY:  As tolerated. LIMITATIONS:  Per General Surgery. CONDITION AT DISCHARGE:  Improved, fair. MEDICATIONS:  NEW:  Colace 100 mg p.o. b.i.d. p.r.n. constipation; Zofran 4 mg 3 times  a day p.r.n. nausea, vomiting; oxycodone 5 mg p.o. q.6 hours p.r.n. pain  per General Surgery. FOLLOWING MEDICATIONS CONTINUED:  Atorvastatin (Lipitor) 20 mg p.o.  daily; Centravites 1 p.o. daily; cyclobenzaprine (Flexeril) 5 mg p.o. 3  times a day p.r.n. muscle spasm; dicyclomine (Bentyl) 10 mg p.o. 4 times  a day p.r.n. FOLLOWING MEDICATIONS CONTINUED, NO CHANGE:  Flonase nasal spray 1 to 2  sprays each nostril daily; loratadine 10 mg p.o. daily; Hyzaar  (losartan/hydrochlorothiazide) 50/12.5 one p.o. daily; melatonin 300 mcg  p.o. q.h.s.; pantoprazole (Protonix) 40 mg p.o. daily; probiotic 1 p.o.  daily; sertraline (Zoloft) 100 mg p.o. daily; sucralfate 1 gm with each  meal and at bedtime; topiramate (Topamax) 50 mg p.o. daily; Tylenol 500  mg 1 to 2 p.o. daily p.r.n. pain, fever. Follow up with the patient's personal physician, Johnna Spears,  Internal Medicine, [facility]. Follow up with Dr. Nati Stearns, General  Surgery as scheduled. Any aspect of the patient's care not discussed in the chart and/or  dictation will be addressed and treated as an outpatient. The patient's medications have been reviewed including, but not limited  to, pre-hospital, hospital and discharge medications. The patient  and/or the patient's personal representatives were specifically advised  the only medications to be taken are those set forth in the discharge  orders and no other medications should be taken.   Any prior

## 2020-02-26 NOTE — PROGRESS NOTES
Hospitalist Progress Note    Patient:  Freida Tovar     YOB: 1960    MRN: [id number]   Admit date: 2/23/2020     Acct: [de-identified]     PCP: Kaylie Streeter    CC--Interval History:    POD 1 cholecystectomy----diet advanced and tolerated---home--2.25.2020    See note below    All other ROS negative except noted in HPI    Diet:  No diet orders on file    Medications:  Scheduled Meds:  Continuous Infusions:  PRN Meds:    Objective:  Labs:  CBC with Differential:    Lab Results   Component Value Date    WBC 7.6 02/25/2020    RBC 4.58 02/25/2020    HGB 13.1 02/25/2020    HCT 40.2 02/25/2020     02/25/2020    MCV 87.8 02/25/2020    MCH 28.6 02/25/2020    MCHC 32.6 02/25/2020    RDW 13.0 02/25/2020    LYMPHOPCT 16 02/25/2020    MONOPCT 5 02/25/2020    BASOPCT 0 02/25/2020    MONOSABS 0.39 02/25/2020    LYMPHSABS 1.21 02/25/2020    EOSABS <0.03 02/25/2020    BASOSABS <0.03 02/25/2020    DIFFTYPE NOT REPORTED 02/25/2020     BMP:    Lab Results   Component Value Date     02/25/2020    K 4.0 02/25/2020     02/25/2020    CO2 24 02/25/2020    BUN 9 02/25/2020    LABALBU 3.6 02/24/2020    CREATININE 0.57 02/25/2020    CALCIUM 9.4 02/25/2020    GFRAA >60 02/25/2020    LABGLOM >60 02/25/2020    GLUCOSE 128 02/25/2020           Physical Exam:  Vitals: /65   Pulse 73   Temp 97.4 °F (36.3 °C) (Oral)   Resp 20   Ht 5' 8.5\" (1.74 m)   Wt 246 lb 11.2 oz (111.9 kg)   SpO2 96%   BMI 36.97 kg/m²   24 hour intake/output:    Intake/Output Summary (Last 24 hours) at 2/25/2020 1918  Last data filed at 2/25/2020 1638  Gross per 24 hour   Intake 370 ml   Output --   Net 370 ml     Last 3 weights:   Wt Readings from Last 3 Encounters:   02/25/20 246 lb 11.2 oz (111.9 kg)   10/29/18 238 lb (108 kg)   04/14/17 224 lb (101.6 kg)     HEENT: Normocephalic and Atraumatic  Neck: Supple, No Masses, Tenderness, Nodularity and No Lymphadenopathy  Chest/Lungs: Clear to Auscultation without Rales, Rhonchi, or Wheezes  Cardiac: Regular Rate and Rhythm  GI/Abdomen: Bowel Sounds Present and Soft, expected incisional tenderness without Guarding or Rebound Tenderness  : Not examined  EXT/Skin: No Edema, No Cyanosis and No Clubbing  Neuro: Alert and Oriented and No Localizing Signs/Symptoms      Assessment:    Active Problems:    Right upper quadrant abdominal pain    Abdominal pain    Post-op pain  Resolved Problems:    * No resolved hospital problems. Corina Cushing, WENDY  59  WF  [LIELANI Esparza--leonel;  Deep Tiwari]  FULL CODE    LOVENOX    Anti-infectives: Ancef IV once    POD ____ laparoscopic robotically assisted cholecystectomy----2.24.2020---[place]  Cholecystic disease---cholelithiasis--2.23.2020  Abdominal pain RUQ---2.23.2020           EKG--2.24.2020--NSR--77--LAE?---NACs   Hypertension  Hyperlipidemia  Hypokalemia  CKD-2  GERD  Chronic back pain  Anxiety   Tobacco abuse--quit--4.14.2012  PMH:   seasonal allergies--allergic rhinitis, sinusitis, melanoma, chronic pain              right knee  PSH:   colonoscopy---2017, appendectomy, bilateral bunionectomy,  left knee,             melanoma excision     Allergies:  Celecoxib, Darvon---propoxyphene---swelling , Onexton--                   clindamycin-phos-benzoyl peroxide        Plan:  1. Home----2.25.2020  2. Home medications reviewed  3. Follow up Dr. Darrel Gomez,   4. Follow up Dr. Korin Larsen, [address] [place] [address]  5.    See orders     Electronically signed by Pamela Soto on 2/25/2020 at 7:18 PM    Hospitalist

## 2020-02-27 LAB — SURGICAL PATHOLOGY REPORT: NORMAL

## 2020-03-11 ENCOUNTER — OFFICE VISIT (OUTPATIENT)
Dept: SURGERY | Age: 60
End: 2020-03-11
Payer: COMMERCIAL

## 2020-03-11 VITALS
DIASTOLIC BLOOD PRESSURE: 90 MMHG | SYSTOLIC BLOOD PRESSURE: 130 MMHG | HEART RATE: 93 BPM | BODY MASS INDEX: 36.29 KG/M2 | TEMPERATURE: 98.9 F | HEIGHT: 69 IN | WEIGHT: 245 LBS

## 2020-03-11 PROCEDURE — 99024 POSTOP FOLLOW-UP VISIT: CPT | Performed by: SURGERY

## 2020-03-11 NOTE — H&P
Vernon Gifford is a 63 y.o. female who presents today for follow-up from laparoscopic cholecystectomy 2 weeks ago. Patient was admitted to the hospital with a right upper quadrant pain. General surgery was consulted and evaluated the patient. She actually had a history of biliary dyskinesia that had had prior work-up. However she had never followed up for surgery and it was felt that her current symptoms were related to this. She was taken for robotic assisted laparoscopic cholecystectomy and was discharged home same day. Since discharge from the hospital patient states she has been doing well. She has had some pain at her left abdominal incision but is no longer requiring pain medication. Tolerating diet though she does state that with certain foods she has noticed some bloating and nausea but denies any emesis. Has been having regular bowel movements. She does report that when she had her symptoms of the bloating and nausea that she had eaten higher fat/greasy foods. Otherwise no problems. Denies any problems with incisions.     Past Medical History:   Diagnosis Date    Allergic rhinitis     Anxiety     Chronic back pain     Chronic pain of right knee     Family history of colon cancer     History of tobacco abuse     Hyperlipidemia     Hypertension     Melanoma ([facility] [address]     Sinusitis        Past Surgical History:   Procedure Laterality Date    APPENDECTOMY      CHOLECYSTECTOMY, LAPAROSCOPIC N/A 2/24/2020    Laparoscopic Robotic Assisted Cholecystectomy performed by Johann Schwartz DO at [facility] COLONOSCOPY  04/28/2017    FAMILY [facility]X COL[facility] CANCER-[facility]    FOOT SURGERY Bilateral     Bunion, hammer toe    KNEE SURGERY Left     SKIN CANCER EXCISION Left     Left side face-melonoma       Current Outpatient Medications   Medication Sig Dispense Refill    docusate sodium (COLACE) 100 MG capsule Take 1 capsule by mouth 2 times daily as needed for Constipation intact with no erythema, induration or fluctuance noted. No evidence of hernias. Extremity: negative  Neuro: CN II-XII grossly intact      Assessment     3  78-year-old female status post robotic assisted laparoscopic cholecystectomy      Plan     1. Patient seems to be healing as expected. Pathology results showed mild chronic inflammation as expected. Results were reviewed with patient and she voices understanding. Patient is advised to continue activity restrictions for an additional 4 weeks at which time she may return to activity as tolerated. Diet as tolerated at this point. I did discuss with her that higher fat/spicy greasy foods may cause her some symptoms but this should improve the further out we get from surgery. Work note provided to return to work with activity restrictions to complete 6 weeks after surgery. Patient may follow-up as needed.     Electronically signed by Raegan Rome DO on 3/11/2020 at 5:14 PM      (Please note that portions of this note were completed with a voice recognition program.  Efforts were made to edit the dictations but occasionally words are mis-transcribed.)

## 2021-01-13 ENCOUNTER — OFFICE VISIT (OUTPATIENT)
Dept: FAMILY MEDICINE CLINIC | Age: 61
End: 2021-01-13
Payer: COMMERCIAL

## 2021-01-13 VITALS
TEMPERATURE: 99.1 F | HEART RATE: 108 BPM | WEIGHT: 243 LBS | SYSTOLIC BLOOD PRESSURE: 126 MMHG | BODY MASS INDEX: 36.83 KG/M2 | OXYGEN SATURATION: 97 % | HEIGHT: 68 IN | DIASTOLIC BLOOD PRESSURE: 78 MMHG

## 2021-01-13 DIAGNOSIS — L24.5 IRRITANT CONTACT DERMATITIS DUE TO OTHER CHEMICAL PRODUCTS: Primary | ICD-10-CM

## 2021-01-13 PROCEDURE — 99203 OFFICE O/P NEW LOW 30 MIN: CPT | Performed by: NURSE PRACTITIONER

## 2021-01-13 RX ORDER — LEVOCETIRIZINE DIHYDROCHLORIDE 5 MG/1
5 TABLET, FILM COATED ORAL NIGHTLY
Qty: 30 TABLET | Refills: 3 | Status: SHIPPED | OUTPATIENT
Start: 2021-01-13

## 2021-01-13 RX ORDER — METFORMIN HYDROCHLORIDE 500 MG/1
TABLET, EXTENDED RELEASE ORAL
COMMUNITY
Start: 2020-11-24

## 2021-01-13 RX ORDER — IPRATROPIUM BROMIDE 42 UG/1
SPRAY, METERED NASAL
COMMUNITY
Start: 2020-10-21

## 2021-01-13 SDOH — ECONOMIC STABILITY: INCOME INSECURITY: HOW HARD IS IT FOR YOU TO PAY FOR THE VERY BASICS LIKE FOOD, HOUSING, MEDICAL CARE, AND HEATING?: NOT ASKED

## 2021-01-13 SDOH — ECONOMIC STABILITY: TRANSPORTATION INSECURITY
IN THE PAST 12 MONTHS, HAS THE LACK OF TRANSPORTATION KEPT YOU FROM MEDICAL APPOINTMENTS OR FROM GETTING MEDICATIONS?: NO

## 2021-01-13 SDOH — ECONOMIC STABILITY: FOOD INSECURITY: WITHIN THE PAST 12 MONTHS, YOU WORRIED THAT YOUR FOOD WOULD RUN OUT BEFORE YOU GOT MONEY TO BUY MORE.: NEVER TRUE

## 2021-01-13 SDOH — ECONOMIC STABILITY: TRANSPORTATION INSECURITY
IN THE PAST 12 MONTHS, HAS LACK OF TRANSPORTATION KEPT YOU FROM MEETINGS, WORK, OR FROM GETTING THINGS NEEDED FOR DAILY LIVING?: NO

## 2021-01-13 ASSESSMENT — PATIENT HEALTH QUESTIONNAIRE - PHQ9
SUM OF ALL RESPONSES TO PHQ QUESTIONS 1-9: 0
SUM OF ALL RESPONSES TO PHQ QUESTIONS 1-9: 0
SUM OF ALL RESPONSES TO PHQ9 QUESTIONS 1 & 2: 0

## 2021-01-13 ASSESSMENT — ENCOUNTER SYMPTOMS: SHORTNESS OF BREATH: 0

## 2021-01-13 NOTE — PROGRESS NOTES
2021    Yakov Norton (:  1960) is a 62 y.o. female, here for evaluation of the following medical concerns: referred by Dr. Brooke Fernandez for evaluation of possible metal allergy. Rash  This is a new problem. The current episode started more than 1 month ago. The problem has been gradually improving since onset. Location: around surgical incision right knee. Rash characteristics: no rash at present. Associated with: Dermabond Prineo wound closure. Pertinent negatives include no fever, joint pain or shortness of breath. Past treatments include topical steroids and oral steroids. The treatment provided moderate relief. There is no history of allergies, asthma, eczema or varicella. Photos on phone depict bright red skin and blistering surrounding right knee incision. Symptoms developed several days after surgery and persisted after dressing removal.  Treated with oral and topical steroids with significant improvement. Rash has resolved at this point. Still complaining of itching. Using topical hydrocortisone cream and loratadine with mild relief. First exposure to Dermabond was 2020 - used for incision closure following lap edy. These well healed incisions itch when her knee incision itches. Review of Systems   Constitutional: Negative for fever. Respiratory: Negative for shortness of breath. Musculoskeletal: Negative for joint pain. Skin: Positive for rash. All other systems reviewed and are negative. Prior to Visit Medications    Medication Sig Taking?  Authorizing Provider   NONFORMULARY William Stress Support Formula Yes Historical Provider, MD   levocetirizine (XYZAL) 5 MG tablet Take 1 tablet by mouth nightly Yes Mariusz Michelle, APRN - CNP   Probiotic Product (PRO-BIOTIC BLEND) CAPS Take 1 capsule by mouth daily Yes Historical Provider, MD   cyclobenzaprine (FLEXERIL) 5 MG tablet Take 5 mg by mouth Yes Historical Provider, MD   Melatonin 300 MCG TABS Take 300 mcg by mouth Yes Historical Provider, MD   acetaminophen (TYLENOL) 500 MG tablet 1-2 twice a day as needed Yes Historical Provider, MD   fluticasone (FLONASE) 50 MCG/ACT nasal spray 1-2 sprays by Nasal route Yes Historical Provider, MD   hydrOXYzine (VISTARIL) 25 MG capsule Take 25 mg by mouth PRN Yes Historical Provider, MD   losartan-hydrochlorothiazide (HYZAAR) 50-12.5 MG per tablet Take 1 tablet by mouth Yes Historical Provider, MD   sertraline (ZOLOFT) 100 MG tablet Take 100 mg by mouth Yes Historical Provider, MD   hydrocortisone 2.5 % cream   Historical Provider, MD   ipratropium (ATROVENT) 0.06 % nasal spray   Historical Provider, MD   metFORMIN (GLUCOPHAGE-XR) 500 MG extended release tablet   Historical Provider, MD   atorvastatin (LIPITOR) 20 MG tablet Take 20 mg by mouth  Historical Provider, MD        Allergies   Allergen Reactions    Dermabond      Severe allergic contact reaction    Celecoxib Other (See Comments)     Upsets her stomach    Darvon [Propoxyphene] Swelling     Darvocet     Onexton [Clindamycin Phos-Benzoyl Perox]        Past Medical History:   Diagnosis Date    Allergic rhinitis     Anxiety     Chronic back pain     Chronic pain of right knee     Family history of colon cancer     History of tobacco abuse     Hyperlipidemia     Hypertension     Melanoma ([facility] [address]     Sinusitis        Past Surgical History:   Procedure Laterality Date    APPENDECTOMY      CHOLECYSTECTOMY, LAPAROSCOPIC N/A 2/24/2020    Laparoscopic Robotic Assisted Cholecystectomy performed by Gala Hayens DO at [address] [place] [address] COLONOSCOPY  04/28/2017    F[facility]ILY [facility]    FOOT SURGERY Bilateral     Bunion, hammer toe    KNEE SURGERY Left     SKIN CANCER EXCISION Left     Left side face-melonoma       Social History     Socioeconomic History    Marital status:      Spouse name: Not on file    Number of children: Not on file    Years of education: Not on file    Highest education level: Not on file   Occupational History    Not on file   Social Needs    Financial resource strain: Not on file    Food insecurity     Worry: Never true     Inability: Never true    Transportation needs     Medical: No     Non-medical: No   Tobacco Use    Smoking status: Former Smoker     Packs/day: 1.00     Years: 34.00     Pack years: 34.00     Quit date: 2012     Years since quittin.7    Smokeless tobacco: Never Used    Tobacco comment: darius 2020   Substance and Sexual Activity    Alcohol use: Yes     Comment: occasional    Drug use: Not on file    Sexual activity: Yes     Partners: Male   Lifestyle    Physical activity     Days per week: Not on file     Minutes per session: Not on file    Stress: Not on file   Relationships    Social connections     Talks on phone: Not on file     Gets together: Not on file     Attends Scientologist service: Not on file     Active member of club or organization: Not on file     Attends meetings of clubs or organizations: Not on file     Relationship status: Not on file    Intimate partner violence     Fear of current or ex partner: Not on file     Emotionally abused: Not on file     Physically abused: Not on file     Forced sexual activity: Not on file   Other Topics Concern    Not on file   Social History Narrative    Not on file        Family History   Problem Relation Age of Onset    COPD Mother     Colon Polyps Mother     Emphysema Mother     Breast Cancer Maternal Grandmother     Glaucoma Father     Parkinsonism Father     Cancer Sister 52        Colon cancer    Cancer Brother 64        CLL    Breast Cancer Sister 52       Vitals:    21 1150   BP: 126/78   Site: Left Upper Arm   Position: Sitting   Cuff Size: Large Adult   Pulse: 108   Temp: 99.1 °F (37.3 °C)   TempSrc: Tympanic   SpO2: 97%   Weight: 243 lb (110.2 kg)   Height: 5' 8\" (1.727 m)     Estimated body mass index is 36.95 kg/m² as calculated from the following:    Height as of this encounter: 5' 8\" (1.727 m). Weight as of this encounter: 243 lb (110.2 kg). Physical Exam  Vitals signs and nursing note reviewed. Constitutional:       General: She is not in acute distress. Appearance: Normal appearance. HENT:      Head: Normocephalic and atraumatic. Right Ear: External ear normal.      Left Ear: External ear normal.      Nose: Nose normal.      Mouth/Throat:      Pharynx: Oropharynx is clear. Eyes:      Conjunctiva/sclera: Conjunctivae normal.   Neck:      Musculoskeletal: Normal range of motion. Pulmonary:      Effort: Pulmonary effort is normal.   Musculoskeletal: Normal range of motion. Skin:     General: Skin is warm and dry. Findings: No rash. Neurological:      General: No focal deficit present. Mental Status: She is alert and oriented to person, place, and time. Psychiatric:         Mood and Affect: Mood normal.         Behavior: Behavior normal.         Thought Content: Thought content normal.         Judgment: Judgment normal.         ASSESSMENT/PLAN:  1. Irritant contact dermatitis due to other chemical products  - taper topical hydrocortisone  - levocetirizine (XYZAL) 5 MG tablet; Take 1 tablet by mouth nightly  Dispense: 30 tablet; Refill: 3  - avoid Dermabond and other other skin glue  - phone call to Vitor Borja and Dr. Eleanor Casiano office with update to allergy history      Return if symptoms worsen or fail to improve. An  electronic signature was used to authenticate this note.     --STEFFANY Brownlee - CNP on 1/13/2021 at 1:04 PM

## 2021-01-13 NOTE — PROGRESS NOTES
Symptoms experienced by patient  Runny nose  No Circles under eyes No Post nasal drip No Difficulty breathing No   Stuffy nose No Grumpiness no Hoarseness No Short of breath No   Sniffling  No Fatigue No Face pain/pressure No Tight/heavy chest No   Sneezing No Nosebleeds No Sore throat No cough No   Blowing nose No Nasal/sinus surgery No Headache  No Cough up mucus No   Itchy/teary eyes No Nasal polyps No Upper teeth hurt No Cough up blood No   Itchy ears No Hearing loss No Night cough No Chest pain No   Itchy nose No Ear problems No Throat clearing No Asthma No   Itchy throat No Tubes in ears No Bad breath No Wheezing No   Itchy roof of mouth No Snoring No Bad taste in mouth No Pneumonia No   Nose rubbing No Mouth breathing No  Ankle swelling No    Overbite No  Difficulty breathing on exertion No    Drooling (toddler) No           Heartburn and indigestion No  Vomiting easily No  Use of antacids (Rolaids, Tums, Maalox) No  Regurgitation of stomach contents No  Chronic cough worse after meals No  Chronic cough cough worse after laying down No  Chronic hoarseness or voice change No  Chest pain No  Stomach pain No  Neck pain No  Sore throat-frequent No  Feeling of throat closing or something stuck in throat No  Frequent burps or hiccups No  Adult onset asthma No  Asthma not relieved with usual treatment No  Anemia No  Asthma worse after meals, alcohol, lying down, bending to tie shoes, or after heartburn No  Chronic sinus disease No    Within the last month, how did the following problems affect the patient?   0=No Problem; 5=Severe Problem    Hoarseness or a problem with your voice 0  Clearing your throat 0  Excess throat mucus or postnasal drip 0  Difficulty swallowing food, liquids, pills 0  Coughing after you ate or after lying down 0  Breathing difficulties or choking episodes 0  Troublesome or annoying cough 0  Sensations of something sticking in your throat or a lump in your throat 0  Heartburn, chest pain, indigestion, or stomach acid coming up 0    Total: 0

## 2021-12-01 ENCOUNTER — PROCEDURE VISIT (OUTPATIENT)
Dept: PODIATRY | Age: 61
End: 2021-12-01
Payer: COMMERCIAL

## 2021-12-01 VITALS
BODY MASS INDEX: 35.31 KG/M2 | WEIGHT: 232.2 LBS | HEART RATE: 84 BPM | RESPIRATION RATE: 20 BRPM | SYSTOLIC BLOOD PRESSURE: 126 MMHG | DIASTOLIC BLOOD PRESSURE: 80 MMHG

## 2021-12-01 DIAGNOSIS — L60.0 OC (ONYCHOCRYPTOSIS): Primary | ICD-10-CM

## 2021-12-01 DIAGNOSIS — M79.674 PAIN IN TOES OF BOTH FEET: ICD-10-CM

## 2021-12-01 DIAGNOSIS — M79.675 PAIN IN TOES OF BOTH FEET: ICD-10-CM

## 2021-12-01 PROCEDURE — 99203 OFFICE O/P NEW LOW 30 MIN: CPT | Performed by: PODIATRIST

## 2021-12-01 RX ORDER — PHENTERMINE HYDROCHLORIDE 37.5 MG/1
37.5 TABLET ORAL DAILY
COMMUNITY
Start: 2021-11-23 | End: 2021-12-23

## 2021-12-01 NOTE — PROGRESS NOTES
capsule Take 25 mg by mouth PRN 4/23/18  Yes Historical Provider, MD   losartan-hydrochlorothiazide (HYZAAR) 50-12.5 MG per tablet Take 1 tablet by mouth 2/21/18  Yes Historical Provider, MD   sertraline (ZOLOFT) 100 MG tablet Take 100 mg by mouth 4/23/18  Yes Historical Provider, MD   hydrocortisone 2.5 % cream  1/5/21   Historical Provider, MD   atorvastatin (LIPITOR) 20 MG tablet Take 20 mg by mouth 8/7/18 3/11/20  Historical Provider, MD     ROS: All 14 ROS systems reviewed and pertinent positives noted above, all others negative. Objective:  Patient is alert and oriented. Vascular: DP and PT pulses palpable 2/4, bilateral.  CFT <3 seconds, bilateral.  Hair growth present to the level of the digits, bilateral.  Edema absent, bilateral.  Varicosities absent, bilateral. Erythema absent, bilateral. Distal Rubor absent bilateral.  Temperature within normal limits bilateral. Hyperpigmentation absent bilateral. No atrophic skin. Neuro: Protective sensation is intact. Reflexes WNL. Musculoskeletal:  Pain present upon palpation of left, right, lateral, medial, hallux. Muscle strength 5/5, Bilateral. within normal limits medial longitudinal arch, Bilateral. ROM WNL. Integument: Onychocryptosis present left, right, lateral, medial, hallux. Granuloma is absent hallux. Paronychia changes with drainage and crust are absent hallux. Skin color, texture, turgor normal. No rashes or lesions. .    Assessment:   Diagnosis Orders   1. OC (onychocryptosis)     2. Pain in toes of both feet         Plan:   Patient examined and evaluated. Current condition and treatment options discussed in detail. No current need for slant back nail cut took place of bilateral hallux nail. Patient want to set up nail procedure near future when she will have a couple days off afterwards, any increase in pain of signs of infection then patient will be seen back for a nail procedure.   Patient will soak qd in warm soapy water or epsom salts and will use OTC antibiotic ointment daily. Patient is low level medical decision making overall. Patient is acute uncomplicated condition. No new testing. Low risk of morbidity at this time.

## 2021-12-16 ENCOUNTER — PROCEDURE VISIT (OUTPATIENT)
Dept: PODIATRY | Age: 61
End: 2021-12-16
Payer: COMMERCIAL

## 2021-12-16 VITALS
DIASTOLIC BLOOD PRESSURE: 80 MMHG | SYSTOLIC BLOOD PRESSURE: 136 MMHG | WEIGHT: 228.4 LBS | RESPIRATION RATE: 20 BRPM | HEART RATE: 88 BPM | BODY MASS INDEX: 34.73 KG/M2

## 2021-12-16 DIAGNOSIS — M79.675 PAIN IN TOES OF BOTH FEET: ICD-10-CM

## 2021-12-16 DIAGNOSIS — M79.674 PAIN IN TOES OF BOTH FEET: ICD-10-CM

## 2021-12-16 DIAGNOSIS — L60.0 OC (ONYCHOCRYPTOSIS): Primary | ICD-10-CM

## 2021-12-16 PROCEDURE — 99999 PR OFFICE/OUTPT VISIT,PROCEDURE ONLY: CPT | Performed by: PODIATRIST

## 2021-12-16 PROCEDURE — 11750 EXCISION NAIL&NAIL MATRIX: CPT | Performed by: PODIATRIST

## 2021-12-16 NOTE — PROGRESS NOTES
Subjective:  Alice Childs is a 62 y.o. female who presents to the office today complaining of an ingrown nail. Symptoms began year(s) ago. Patient relates pain is Present. Pain is rated 4 out of 10 and is described as waxing and waning. Treatments prior to today's visit include: cuting back at home. Currently denies F/C/N/V. Allergies   Allergen Reactions    Dermabond      Severe allergic contact reaction    Celecoxib Other (See Comments)     Upsets her stomach    Darvon [Propoxyphene] Swelling     Darvocet     Onexton [Clindamycin Phos-Benzoyl Perox]        Past Medical History:   Diagnosis Date    Allergic rhinitis     Anxiety     Chronic back pain     Chronic pain of right knee     Family history of colon cancer     History of tobacco abuse     Hyperlipidemia     Hypertension     Melanoma ([facility] [address]     Sinusitis        Prior to Admission medications    Medication Sig Start Date End Date Taking? Authorizing Provider   phentermine (ADIPEX-P) 37.5 MG tablet Take 37.5 mg by mouth daily.  11/23/21 12/23/21 Yes Historical Provider, MD   NONFORMULARY William Stress Support Formula   Yes Historical Provider, MD   hydrocortisone 2.5 % cream  1/5/21  Yes Historical Provider, MD   ipratropium (ATROVENT) 0.06 % nasal spray  10/21/20  Yes Historical Provider, MD   metFORMIN (GLUCOPHAGE-XR) 500 MG extended release tablet  11/24/20  Yes Historical Provider, MD   levocetirizine (XYZAL) 5 MG tablet Take 1 tablet by mouth nightly 1/13/21  Yes Artemio Ervin, APRN - CNP   Probiotic Product (PRO-BIOTIC BLEND) CAPS Take 1 capsule by mouth daily   Yes Historical Provider, MD   cyclobenzaprine (FLEXERIL) 5 MG tablet Take 5 mg by mouth 8/7/18  Yes Historical Provider, MD   Melatonin 300 MCG TABS Take 300 mcg by mouth   Yes Historical Provider, MD   acetaminophen (TYLENOL) 500 MG tablet 1-2 twice a day as needed 5/8/18  Yes Historical Provider, MD   fluticasone (FLONASE) 50 MCG/ACT nasal spray 1-2 sprays by Nasal route 5/8/18  Yes Historical Provider, MD   hydrOXYzine (VISTARIL) 25 MG capsule Take 25 mg by mouth PRN 4/23/18  Yes Historical Provider, MD   losartan-hydrochlorothiazide (HYZAAR) 50-12.5 MG per tablet Take 1 tablet by mouth 2/21/18  Yes Historical Provider, MD   sertraline (ZOLOFT) 100 MG tablet Take 100 mg by mouth 4/23/18  Yes Historical Provider, MD   atorvastatin (LIPITOR) 20 MG tablet Take 20 mg by mouth 8/7/18 3/11/20  Historical Provider, MD     ROS: All 14 ROS systems reviewed and pertinent positives noted above, all others negative. Objective:  Patient is alert and oriented. Vascular: DP and PT pulses palpable 2/4, bilateral.  CFT <3 seconds, bilateral.  Hair growth present to the level of the digits, bilateral.  Edema absent, bilateral.  Varicosities absent, bilateral. Erythema absent, bilateral. Distal Rubor absent bilateral.  Temperature within normal limits bilateral. Hyperpigmentation absent bilateral. No atrophic skin. Neuro: Protective sensation is intact. Reflexes WNL. Musculoskeletal:  Pain present upon palpation of left, right, lateral, medial, hallux. Muscle strength 5/5, Bilateral. within normal limits medial longitudinal arch, Bilateral. ROM WNL. Integument: Onychocryptosis present left, right, lateral, medial, hallux. Granuloma is absent hallux. Paronychia changes with drainage and crust are absent hallux. Skin color, texture, turgor normal. No rashes or lesions. .    Assessment:   Diagnosis Orders   1. OC (onychocryptosis)  TN REMOVAL OF NAIL BED   2. Pain in toes of both feet  TN REMOVAL OF NAIL BED         Plan:  Patient was educated on all treatment options. Risks and complications were discussed with the patient and they opted for a phenol matrixectomy nail procedure on the left, right, lateral, medial, hallux. Verbal and signed consent took place. A total of 3cc 1% lidocaine plain was used to anesthetize the left, right, lateral, medial, hallux.   It was then prepped and draped

## 2023-06-14 PROBLEM — M65.341 TRIGGER RING FINGER OF RIGHT HAND: Status: ACTIVE | Noted: 2023-06-14

## 2023-06-14 PROBLEM — R73.9 HYPERGLYCEMIA: Status: ACTIVE | Noted: 2020-04-07

## 2023-06-14 PROBLEM — E66.9 OBESITY (BMI 30-39.9): Status: ACTIVE | Noted: 2017-02-06

## 2023-06-14 PROBLEM — M79.672 PAIN IN LEFT FOOT: Status: ACTIVE | Noted: 2020-06-04

## 2023-06-14 PROBLEM — G44.309 POST-CONCUSSION HEADACHE: Status: ACTIVE | Noted: 2017-10-03

## 2023-06-14 PROBLEM — R10.9 ABDOMINAL PAIN: Status: ACTIVE | Noted: 2018-05-08

## 2023-06-14 PROBLEM — M15.9 PRIMARY OSTEOARTHRITIS INVOLVING MULTIPLE JOINTS: Status: ACTIVE | Noted: 2023-06-13

## 2023-06-14 PROBLEM — H43.399 FLOATERS: Status: ACTIVE | Noted: 2017-10-06

## 2023-06-14 PROBLEM — Z96.651 PRESENCE OF RIGHT ARTIFICIAL KNEE JOINT: Status: ACTIVE | Noted: 2023-06-14

## 2023-06-14 PROBLEM — M17.12 PRIMARY OSTEOARTHRITIS OF LEFT KNEE: Status: ACTIVE | Noted: 2019-01-16

## 2023-06-14 PROBLEM — B35.1 ONYCHOMYCOSIS: Status: ACTIVE | Noted: 2023-06-14

## 2023-07-12 VITALS
HEIGHT: 68 IN | SYSTOLIC BLOOD PRESSURE: 110 MMHG | BODY MASS INDEX: 33.71 KG/M2 | WEIGHT: 222.44 LBS | DIASTOLIC BLOOD PRESSURE: 60 MMHG | OXYGEN SATURATION: 95 % | HEART RATE: 77 BPM

## 2023-09-01 ENCOUNTER — TELEPHONE (OUTPATIENT)
Dept: SURGERY | Age: 63
End: 2023-09-01

## 2023-09-01 NOTE — TELEPHONE ENCOUNTER
Left VM for patient to return call to office to schedule 5 year repeat screening colonoscopy @ [facility].

## 2023-09-18 ENCOUNTER — TELEPHONE (OUTPATIENT)
Dept: SURGERY | Age: 63
End: 2023-09-18

## 2023-09-18 RX ORDER — BISACODYL 5 MG/1
TABLET, DELAYED RELEASE ORAL
Qty: 2 TABLET | Refills: 0 | Status: SHIPPED | OUTPATIENT
Start: 2023-09-18

## 2023-09-18 RX ORDER — POLYETHYLENE GLYCOL 3350, SODIUM CHLORIDE, SODIUM BICARBONATE, POTASSIUM CHLORIDE 420; 11.2; 5.72; 1.48 G/4L; G/4L; G/4L; G/4L
POWDER, FOR SOLUTION ORAL
Qty: 4000 ML | Refills: 0 | Status: SHIPPED | OUTPATIENT
Start: 2023-09-18

## 2023-09-18 RX ORDER — PHENTERMINE HYDROCHLORIDE 37.5 MG/1
37.5 CAPSULE ORAL EVERY MORNING
COMMUNITY

## 2023-09-18 NOTE — TELEPHONE ENCOUNTER
[address] [facility] Colonoscopy Worksheet    Patient Name: Constance Latif  : 1960  Primary Care Physician: Melida Miranda  Today's Date: 2023    Surgery Location:   [][facility] [x] [place] [] [facility] [] Other    Why has a Colonoscopy been recommended for you?   - 5 year repeat screening     Have had a colonoscopy before? [x] Yes  [] No    If so, where and when was that done?   - 2018    Was anything found during the last scope?  - x 1 tubular adenoma polyp otherwise normal, [facility]     Do you have a family history of colon cancer? - Yes    Family History   Problem Relation Age of Onset    COPD Mother     Colon Polyps Mother     Emphysema Mother     Glaucoma Father     Parkinsonism Father     Colon Cancer Sister 52    Breast Cancer Sister 52    Cancer Brother 64        CLL    Breast Cancer Maternal Grandmother          How much caffeine do you drink in a day?   - 2-3 cans of diet pop     Any tobacco use? [] Yes    [x] No    Any alcohol use? [x] Yes    [] No  If yes, how often? Patient reports drinking couple beers a day and sometimes drinks liquor. Do you have any medication allergies? [x] Yes    [] No    If yes, please list: Darvocet, Surgical Glue (Dermabond), Celecoxib, Onexton     Do you take Warfarin, Coumadin, Plavix, Eliquis, Xarelto, or aspirin OR do you take a medication that thins your blood? [] Yes  [x] No    Medications:    Current Outpatient Medications on File Prior to Visit   Medication Sig Dispense Refill    metFORMIN (GLUCOPHAGE) 500 MG tablet Take 1 tablet by mouth daily (with breakfast)      phentermine (ADIPEX-P) 37.5 MG capsule Take 1 capsule by mouth every morning.  Max Daily Amount: 37.5 mg      naproxen sodium (ANAPROX) 220 MG tablet Take 1 tablet by mouth daily as needed      atorvastatin (LIPITOR) 80 MG tablet Take 1 tablet by mouth daily      levocetirizine (XYZAL) 5 MG tablet Take 1 tablet

## (undated) DEVICE — DRAPE,UTILITY,TAPE,15X26,STERILE: Brand: MEDLINE

## (undated) DEVICE — ARM DRAPE

## (undated) DEVICE — SOLUTION IV IRRIG POUR BRL 0.9% SODIUM CHL 2F7124

## (undated) DEVICE — COVER LT HNDL BLU PLAS

## (undated) DEVICE — SKIN AFFIX SURG ADHESIVE 72/CS 0.55ML: Brand: MEDLINE

## (undated) DEVICE — GARMENT,MEDLINE,DVT,INT,CALF,LG, GEN2: Brand: MEDLINE

## (undated) DEVICE — CHLORAPREP 26ML ORANGE

## (undated) DEVICE — BLADELESS OBTURATOR: Brand: WECK VISTA

## (undated) DEVICE — GLOVE ORANGE PI 7 1/2   MSG9075

## (undated) DEVICE — GLOVE ORANGE PI 7   MSG9070

## (undated) DEVICE — OBTURATOR ROBOTIC DIA8MM LNG BLDELSS ENDOSCP DISP DA VINCI

## (undated) DEVICE — ANCHOR TISSUE RETRIEVAL SYSTEM, BAG SIZE 125 ML, PORT SIZE 8 MM: Brand: ANCHOR TISSUE RETRIEVAL SYSTEM

## (undated) DEVICE — CANNULA SEAL

## (undated) DEVICE — INSUFFLATION TUBING SET, ENDOFLATOR 50: Brand: N.A.

## (undated) DEVICE — TROCAR: Brand: KII FIOS FIRST ENTRY

## (undated) DEVICE — BLADE ES ELASTOMERIC COAT INSUL DURABLE BEND UPTO 90DEG

## (undated) DEVICE — Device

## (undated) DEVICE — INSUFFLATION NEEDLE TO ESTABLISH PNEUMOPERITONEUM.: Brand: INSUFFLATION NEEDLE

## (undated) DEVICE — PACK SURG PROC REMINDER N WVN DISPOSABLE BEAC TIME OUT

## (undated) DEVICE — SUTURE MCRYL SZ 4-0 L18IN ABSRB UD L19MM PS-2 3/8 CIR PRIM Y496G